# Patient Record
Sex: MALE | Race: BLACK OR AFRICAN AMERICAN | NOT HISPANIC OR LATINO | Employment: OTHER | URBAN - METROPOLITAN AREA
[De-identification: names, ages, dates, MRNs, and addresses within clinical notes are randomized per-mention and may not be internally consistent; named-entity substitution may affect disease eponyms.]

---

## 2023-01-22 ENCOUNTER — HOSPITAL ENCOUNTER (EMERGENCY)
Facility: HOSPITAL | Age: 28
Discharge: HOME | End: 2023-01-22
Attending: EMERGENCY MEDICINE
Payer: OTHER GOVERNMENT

## 2023-01-22 VITALS
HEIGHT: 72 IN | RESPIRATION RATE: 18 BRPM | TEMPERATURE: 99 F | WEIGHT: 218.25 LBS | DIASTOLIC BLOOD PRESSURE: 61 MMHG | SYSTOLIC BLOOD PRESSURE: 126 MMHG | OXYGEN SATURATION: 98 % | HEART RATE: 70 BPM | BODY MASS INDEX: 29.56 KG/M2

## 2023-01-22 DIAGNOSIS — B34.9 VIRAL SYNDROME: ICD-10-CM

## 2023-01-22 DIAGNOSIS — R11.2 NAUSEA AND VOMITING, UNSPECIFIED VOMITING TYPE: Primary | ICD-10-CM

## 2023-01-22 LAB
ALBUMIN SERPL BCP-MCNC: 3.8 G/DL (ref 3.5–5.2)
ALP SERPL-CCNC: 64 U/L (ref 55–135)
ALT SERPL W/O P-5'-P-CCNC: 99 U/L (ref 10–44)
ANION GAP SERPL CALC-SCNC: 11 MMOL/L (ref 8–16)
AST SERPL-CCNC: 67 U/L (ref 10–40)
BASOPHILS NFR BLD: 0 % (ref 0–1.9)
BILIRUB SERPL-MCNC: 1 MG/DL (ref 0.1–1)
BUN SERPL-MCNC: 12 MG/DL (ref 6–20)
CALCIUM SERPL-MCNC: 9.3 MG/DL (ref 8.7–10.5)
CHLORIDE SERPL-SCNC: 103 MMOL/L (ref 95–110)
CO2 SERPL-SCNC: 25 MMOL/L (ref 23–29)
CREAT SERPL-MCNC: 1.1 MG/DL (ref 0.5–1.4)
DIFFERENTIAL METHOD: ABNORMAL
EOSINOPHIL NFR BLD: 0 % (ref 0–8)
ERYTHROCYTE [DISTWIDTH] IN BLOOD BY AUTOMATED COUNT: 14.2 % (ref 11.5–14.5)
EST. GFR  (NO RACE VARIABLE): >60 ML/MIN/1.73 M^2
GLUCOSE SERPL-MCNC: 103 MG/DL (ref 70–110)
HCT VFR BLD AUTO: 42.5 % (ref 40–54)
HGB BLD-MCNC: 13.4 G/DL (ref 14–18)
IMM GRANULOCYTES # BLD AUTO: ABNORMAL K/UL
IMM GRANULOCYTES NFR BLD AUTO: ABNORMAL %
LIPASE SERPL-CCNC: 20 U/L (ref 4–60)
LYMPHOCYTES NFR BLD: 49 % (ref 18–48)
MCH RBC QN AUTO: 27.5 PG (ref 27–31)
MCHC RBC AUTO-ENTMCNC: 31.5 G/DL (ref 32–36)
MCV RBC AUTO: 87 FL (ref 82–98)
MONOCYTES NFR BLD: 2 % (ref 4–15)
NEUTROPHILS NFR BLD: 47 % (ref 38–73)
NEUTS BAND NFR BLD MANUAL: 2 %
NRBC BLD-RTO: 0 /100 WBC
PLATELET # BLD AUTO: 195 K/UL (ref 150–450)
PLATELET BLD QL SMEAR: ABNORMAL
PMV BLD AUTO: 10.4 FL (ref 9.2–12.9)
POTASSIUM SERPL-SCNC: 3.9 MMOL/L (ref 3.5–5.1)
PROT SERPL-MCNC: 8.1 G/DL (ref 6–8.4)
RBC # BLD AUTO: 4.87 M/UL (ref 4.6–6.2)
SODIUM SERPL-SCNC: 139 MMOL/L (ref 136–145)
WBC # BLD AUTO: 11.37 K/UL (ref 3.9–12.7)

## 2023-01-22 PROCEDURE — 96361 HYDRATE IV INFUSION ADD-ON: CPT

## 2023-01-22 PROCEDURE — 83690 ASSAY OF LIPASE: CPT | Performed by: EMERGENCY MEDICINE

## 2023-01-22 PROCEDURE — 80053 COMPREHEN METABOLIC PANEL: CPT | Performed by: EMERGENCY MEDICINE

## 2023-01-22 PROCEDURE — 36415 COLL VENOUS BLD VENIPUNCTURE: CPT | Performed by: EMERGENCY MEDICINE

## 2023-01-22 PROCEDURE — 99284 EMERGENCY DEPT VISIT MOD MDM: CPT | Mod: 25

## 2023-01-22 PROCEDURE — 85027 COMPLETE CBC AUTOMATED: CPT | Performed by: EMERGENCY MEDICINE

## 2023-01-22 PROCEDURE — 63600175 PHARM REV CODE 636 W HCPCS: Performed by: EMERGENCY MEDICINE

## 2023-01-22 PROCEDURE — 85007 BL SMEAR W/DIFF WBC COUNT: CPT | Performed by: EMERGENCY MEDICINE

## 2023-01-22 PROCEDURE — 96374 THER/PROPH/DIAG INJ IV PUSH: CPT

## 2023-01-22 RX ORDER — ONDANSETRON 2 MG/ML
4 INJECTION INTRAMUSCULAR; INTRAVENOUS
Status: COMPLETED | OUTPATIENT
Start: 2023-01-22 | End: 2023-01-22

## 2023-01-22 RX ORDER — ONDANSETRON 4 MG/1
4 TABLET, ORALLY DISINTEGRATING ORAL EVERY 8 HOURS PRN
Qty: 12 TABLET | Refills: 0 | Status: SHIPPED | OUTPATIENT
Start: 2023-01-22

## 2023-01-22 RX ADMIN — SODIUM CHLORIDE, POTASSIUM CHLORIDE, SODIUM LACTATE AND CALCIUM CHLORIDE 1000 ML: 600; 310; 30; 20 INJECTION, SOLUTION INTRAVENOUS at 04:01

## 2023-01-22 RX ADMIN — ONDANSETRON 4 MG: 2 INJECTION INTRAMUSCULAR; INTRAVENOUS at 04:01

## 2023-01-22 NOTE — ED PROVIDER NOTES
Encounter Date: 1/22/2023    SCRIBE #1 NOTE: I, Irais Leiva, am scribing for, and in the presence of,  Rodo Ohara MD.     History     Chief Complaint   Patient presents with    Vomiting     Diagnosed with the flu on Tuesday. Pt has been taking meds with no relief.      Time seen by provider: 4:07 PM on 01/22/2023    Galdino Burrell is a 27 y.o. male with no PMHx or PSHx documented who presents to the ED for evaluation of persistent N/V that onset 5 days ago.  Patient reports being evaluated by urgent care at that time where he was diagnosed with the flu.  He was not prescribed Tamiflu at that visit, but was started on a nasal spray, Ibuprofen and cough suppressants with Sx continuing.  Patient reports associated subjective fevers, episodic diarrhea and mild abdominal pain.  He expresses concern for dehydration.  Patient notes his emesis is dark colored, but admits uncertainty if it is due to the presence of blood.  The patient denies any other symptoms at this time.      The history is provided by the patient.   Review of patient's allergies indicates:  No Known Allergies  No past medical history on file.  No past surgical history on file.  No family history on file.     Review of Systems   Constitutional:  Positive for fever (subjective).   HENT:  Negative for sore throat.    Respiratory:  Negative for shortness of breath.    Cardiovascular:  Negative for chest pain.   Gastrointestinal:  Positive for abdominal pain (mild), diarrhea, nausea and vomiting.   Genitourinary:  Negative for dysuria.   Musculoskeletal:  Negative for back pain.   Skin:  Negative for rash.   Neurological:  Negative for weakness.   Hematological:  Does not bruise/bleed easily.     Physical Exam     Initial Vitals   BP Pulse Resp Temp SpO2   01/22/23 1556 01/22/23 1553 01/22/23 1553 01/22/23 1553 01/22/23 1553   (!) 144/84 71 16 98.9 °F (37.2 °C) 100 %      MAP       --                Physical Exam    Nursing note and vitals  reviewed.  Constitutional: He appears well-developed and well-nourished. He is not diaphoretic. No distress.   HENT:   Head: Normocephalic and atraumatic.   Mouth/Throat: Mucous membranes are dry.   Eyes: EOM are normal. Pupils are equal, round, and reactive to light.   Neck: Neck supple.   Normal range of motion.  Cardiovascular:  Normal rate, regular rhythm, normal heart sounds and intact distal pulses.     Exam reveals no gallop and no friction rub.       No murmur heard.  Pulmonary/Chest: Breath sounds normal. No respiratory distress. He has no wheezes. He has no rhonchi. He has no rales.   Abdominal: Abdomen is soft. Bowel sounds are normal. There is no abdominal tenderness. There is no rebound and no guarding.   Musculoskeletal:         General: Normal range of motion.      Cervical back: Normal range of motion and neck supple.     Neurological: He is alert and oriented to person, place, and time.   Skin: Skin is warm.   Psychiatric: He has a normal mood and affect. His behavior is normal. Judgment and thought content normal.       ED Course   Procedures  Labs Reviewed   COMPREHENSIVE METABOLIC PANEL - Abnormal; Notable for the following components:       Result Value    AST 67 (*)     ALT 99 (*)     All other components within normal limits   CBC W/ AUTO DIFFERENTIAL - Abnormal; Notable for the following components:    Hemoglobin 13.4 (*)     MCHC 31.5 (*)     Lymph % 49.0 (*)     Mono % 2.0 (*)     All other components within normal limits   LIPASE          Imaging Results    None          Medications   ondansetron injection 4 mg (4 mg Intravenous Given 1/22/23 1634)   lactated ringers bolus 1,000 mL (1,000 mLs Intravenous New Bag 1/22/23 1634)     Medical Decision Making:   History:   Old Medical Records: I decided to obtain old medical records.  Initial Assessment:   27-year-old male presented with vomiting.  Differential Diagnosis:   Initial differential diagnosis included but not limited to viral  illness, dehydration, and electrolyte abnormality.  Clinical Tests:   Lab Tests: Ordered and Reviewed  ED Management:  The patient was emergently evaluated in the emergency department, his evaluation was significant for a well-appearing male with a benign abdominal exam.  The patient's blood work shows no acute abnormalities.  The patient was treated with IV fluids and IV Zofran, with improvement in his symptoms.  The etiology of his vomiting is likely secondary to his recent flu diagnosis.  The patient is stable for discharge to home and does not require admission at this time.  He will be discharged home with a prescription for ODT Zofran.  He is referred to primary care for follow-up.        Scribe Attestation:   Scribe #1: I performed the above scribed service and the documentation accurately describes the services I performed. I attest to the accuracy of the note.               I, Dr. Rodo Ohara, personally performed the services described in this documentation. All medical record entries made by the scribe were at my direction and in my presence.  I have reviewed the chart and agree that the record reflects my personal performance and is accurate and complete. Rodo Ohara MD.  5:46 PM 01/22/2023      Clinical Impression:   Final diagnoses:  [R11.2] Nausea and vomiting, unspecified vomiting type (Primary)  [B34.9] Viral syndrome        ED Disposition Condition    Discharge Stable          ED Prescriptions       Medication Sig Dispense Start Date End Date Auth. Provider    ondansetron (ZOFRAN-ODT) 4 MG TbDL Take 1 tablet (4 mg total) by mouth every 8 (eight) hours as needed (nausea/vomiting). 12 tablet 1/22/2023 -- Rodo Ohara MD          Follow-up Information       Follow up With Specialties Details Why Contact [facility]  Schedule an appointment as soon as possible for a visit   [facility] [place] [address]  [phone]               Rodo  HARRY Ohara MD  01/22/23 5719

## 2023-01-22 NOTE — Clinical Note
"Galdino"Anastasia Burrell was seen and treated in our emergency department on 1/22/2023.  He may return to work on 01/24/2023.       If you have any questions or concerns, please don't hesitate to call.      Edgar Murray RN    "

## 2023-02-04 ENCOUNTER — HOSPITAL ENCOUNTER (INPATIENT)
Facility: HOSPITAL | Age: 28
LOS: 1 days | Discharge: ANOTHER HOSPITAL | DRG: 812 | End: 2023-02-05
Attending: EMERGENCY MEDICINE | Admitting: STUDENT IN AN ORGANIZED HEALTH CARE EDUCATION/TRAINING PROGRAM
Payer: OTHER GOVERNMENT

## 2023-02-04 DIAGNOSIS — K25.4 GASTROINTESTINAL HEMORRHAGE ASSOCIATED WITH GASTRIC ULCER: Primary | ICD-10-CM

## 2023-02-04 DIAGNOSIS — T80.92XA TRANSFUSION REACTION: ICD-10-CM

## 2023-02-04 DIAGNOSIS — D50.0 IRON DEFICIENCY ANEMIA DUE TO CHRONIC BLOOD LOSS: ICD-10-CM

## 2023-02-04 LAB
ABO + RH BLD: NORMAL
ALBUMIN SERPL BCP-MCNC: 2.4 G/DL (ref 3.5–5.2)
ALBUMIN SERPL BCP-MCNC: 2.9 G/DL (ref 3.5–5.2)
ALP SERPL-CCNC: 45 U/L (ref 55–135)
ALP SERPL-CCNC: 51 U/L (ref 55–135)
ALT SERPL W/O P-5'-P-CCNC: 20 U/L (ref 10–44)
ALT SERPL W/O P-5'-P-CCNC: 26 U/L (ref 10–44)
ANION GAP SERPL CALC-SCNC: 6 MMOL/L (ref 8–16)
ANION GAP SERPL CALC-SCNC: 7 MMOL/L (ref 8–16)
AST SERPL-CCNC: 16 U/L (ref 10–40)
AST SERPL-CCNC: 17 U/L (ref 10–40)
BASOPHILS # BLD AUTO: 0.04 K/UL (ref 0–0.2)
BASOPHILS NFR BLD: 0.3 % (ref 0–1.9)
BILIRUB SERPL-MCNC: 0.1 MG/DL (ref 0.1–1)
BILIRUB SERPL-MCNC: 0.2 MG/DL (ref 0.1–1)
BLD GP AB SCN CELLS X3 SERPL QL: NORMAL
BUN SERPL-MCNC: 22 MG/DL (ref 6–20)
BUN SERPL-MCNC: 23 MG/DL (ref 6–20)
CALCIUM SERPL-MCNC: 7.3 MG/DL (ref 8.7–10.5)
CALCIUM SERPL-MCNC: 7.9 MG/DL (ref 8.7–10.5)
CHLORIDE SERPL-SCNC: 110 MMOL/L (ref 95–110)
CHLORIDE SERPL-SCNC: 112 MMOL/L (ref 95–110)
CO2 SERPL-SCNC: 23 MMOL/L (ref 23–29)
CO2 SERPL-SCNC: 23 MMOL/L (ref 23–29)
CREAT SERPL-MCNC: 0.9 MG/DL (ref 0.5–1.4)
CREAT SERPL-MCNC: 0.9 MG/DL (ref 0.5–1.4)
DIFFERENTIAL METHOD: ABNORMAL
EOSINOPHIL # BLD AUTO: 0.1 K/UL (ref 0–0.5)
EOSINOPHIL NFR BLD: 1.1 % (ref 0–8)
ERYTHROCYTE [DISTWIDTH] IN BLOOD BY AUTOMATED COUNT: 15.2 % (ref 11.5–14.5)
EST. GFR  (NO RACE VARIABLE): >60 ML/MIN/1.73 M^2
EST. GFR  (NO RACE VARIABLE): >60 ML/MIN/1.73 M^2
GLUCOSE SERPL-MCNC: 114 MG/DL (ref 70–110)
GLUCOSE SERPL-MCNC: 135 MG/DL (ref 70–110)
HCT VFR BLD AUTO: 15.8 % (ref 40–54)
HGB BLD-MCNC: 4.9 G/DL (ref 14–18)
IMM GRANULOCYTES # BLD AUTO: 0.11 K/UL (ref 0–0.04)
IMM GRANULOCYTES NFR BLD AUTO: 0.9 % (ref 0–0.5)
LYMPHOCYTES # BLD AUTO: 3.7 K/UL (ref 1–4.8)
LYMPHOCYTES NFR BLD: 29 % (ref 18–48)
MCH RBC QN AUTO: 28.5 PG (ref 27–31)
MCHC RBC AUTO-ENTMCNC: 31 G/DL (ref 32–36)
MCV RBC AUTO: 92 FL (ref 82–98)
MONOCYTES # BLD AUTO: 0.6 K/UL (ref 0.3–1)
MONOCYTES NFR BLD: 4.3 % (ref 4–15)
NEUTROPHILS # BLD AUTO: 8.2 K/UL (ref 1.8–7.7)
NEUTROPHILS NFR BLD: 64.4 % (ref 38–73)
NRBC BLD-RTO: 1 /100 WBC
PLATELET # BLD AUTO: 153 K/UL (ref 150–450)
PLATELET BLD QL SMEAR: ABNORMAL
PMV BLD AUTO: 11.1 FL (ref 9.2–12.9)
POTASSIUM SERPL-SCNC: 3.6 MMOL/L (ref 3.5–5.1)
POTASSIUM SERPL-SCNC: 4 MMOL/L (ref 3.5–5.1)
PROT SERPL-MCNC: 4.2 G/DL (ref 6–8.4)
PROT SERPL-MCNC: 5 G/DL (ref 6–8.4)
RBC # BLD AUTO: 1.72 M/UL (ref 4.6–6.2)
SODIUM SERPL-SCNC: 140 MMOL/L (ref 136–145)
SODIUM SERPL-SCNC: 141 MMOL/L (ref 136–145)
WBC # BLD AUTO: 12.73 K/UL (ref 3.9–12.7)

## 2023-02-04 PROCEDURE — P9016 RBC LEUKOCYTES REDUCED: HCPCS | Performed by: EMERGENCY MEDICINE

## 2023-02-04 PROCEDURE — 36415 COLL VENOUS BLD VENIPUNCTURE: CPT | Performed by: NURSE PRACTITIONER

## 2023-02-04 PROCEDURE — 80053 COMPREHEN METABOLIC PANEL: CPT | Performed by: EMERGENCY MEDICINE

## 2023-02-04 PROCEDURE — 96375 TX/PRO/DX INJ NEW DRUG ADDON: CPT

## 2023-02-04 PROCEDURE — 85025 COMPLETE CBC W/AUTO DIFF WBC: CPT | Performed by: EMERGENCY MEDICINE

## 2023-02-04 PROCEDURE — 63600175 PHARM REV CODE 636 W HCPCS: Performed by: EMERGENCY MEDICINE

## 2023-02-04 PROCEDURE — 36430 TRANSFUSION BLD/BLD COMPNT: CPT

## 2023-02-04 PROCEDURE — 12000002 HC ACUTE/MED SURGE SEMI-PRIVATE ROOM

## 2023-02-04 PROCEDURE — 36415 COLL VENOUS BLD VENIPUNCTURE: CPT | Performed by: EMERGENCY MEDICINE

## 2023-02-04 PROCEDURE — 96361 HYDRATE IV INFUSION ADD-ON: CPT

## 2023-02-04 PROCEDURE — 87389 HIV-1 AG W/HIV-1&-2 AB AG IA: CPT | Performed by: EMERGENCY MEDICINE

## 2023-02-04 PROCEDURE — 80053 COMPREHEN METABOLIC PANEL: CPT | Mod: 91 | Performed by: NURSE PRACTITIONER

## 2023-02-04 PROCEDURE — 86900 BLOOD TYPING SEROLOGIC ABO: CPT | Performed by: EMERGENCY MEDICINE

## 2023-02-04 PROCEDURE — 86920 COMPATIBILITY TEST SPIN: CPT | Performed by: EMERGENCY MEDICINE

## 2023-02-04 PROCEDURE — 86803 HEPATITIS C AB TEST: CPT | Performed by: EMERGENCY MEDICINE

## 2023-02-04 PROCEDURE — 25000003 PHARM REV CODE 250: Performed by: EMERGENCY MEDICINE

## 2023-02-04 PROCEDURE — 96374 THER/PROPH/DIAG INJ IV PUSH: CPT

## 2023-02-04 RX ORDER — BUTALBITAL, ACETAMINOPHEN AND CAFFEINE 50; 325; 40 MG/1; MG/1; MG/1
1 TABLET ORAL
Status: COMPLETED | OUTPATIENT
Start: 2023-02-04 | End: 2023-02-04

## 2023-02-04 RX ORDER — PANTOPRAZOLE SODIUM 40 MG/10ML
40 INJECTION, POWDER, LYOPHILIZED, FOR SOLUTION INTRAVENOUS
Status: ACTIVE | OUTPATIENT
Start: 2023-02-04 | End: 2023-02-05

## 2023-02-04 RX ORDER — HYDROCODONE BITARTRATE AND ACETAMINOPHEN 500; 5 MG/1; MG/1
TABLET ORAL
Status: DISCONTINUED | OUTPATIENT
Start: 2023-02-04 | End: 2023-02-05 | Stop reason: HOSPADM

## 2023-02-04 RX ORDER — ACETAMINOPHEN 325 MG/1
325 TABLET ORAL
Status: COMPLETED | OUTPATIENT
Start: 2023-02-04 | End: 2023-02-04

## 2023-02-04 RX ORDER — SODIUM CHLORIDE 9 MG/ML
INJECTION, SOLUTION INTRAVENOUS CONTINUOUS
Status: DISCONTINUED | OUTPATIENT
Start: 2023-02-05 | End: 2023-02-05

## 2023-02-04 RX ORDER — PANTOPRAZOLE SODIUM 40 MG/1
40 TABLET, DELAYED RELEASE ORAL
Status: COMPLETED | OUTPATIENT
Start: 2023-02-04 | End: 2023-02-04

## 2023-02-04 RX ORDER — SODIUM CHLORIDE 9 MG/ML
INJECTION, SOLUTION INTRAVENOUS
Status: COMPLETED | OUTPATIENT
Start: 2023-02-04 | End: 2023-02-04

## 2023-02-04 RX ORDER — MAG HYDROX/ALUMINUM HYD/SIMETH 200-200-20
5 SUSPENSION, ORAL (FINAL DOSE FORM) ORAL
Status: COMPLETED | OUTPATIENT
Start: 2023-02-04 | End: 2023-02-04

## 2023-02-04 RX ORDER — METOCLOPRAMIDE HYDROCHLORIDE 5 MG/ML
10 INJECTION INTRAMUSCULAR; INTRAVENOUS
Status: COMPLETED | OUTPATIENT
Start: 2023-02-04 | End: 2023-02-04

## 2023-02-04 RX ORDER — KETOROLAC TROMETHAMINE 30 MG/ML
30 INJECTION, SOLUTION INTRAMUSCULAR; INTRAVENOUS
Status: COMPLETED | OUTPATIENT
Start: 2023-02-04 | End: 2023-02-04

## 2023-02-04 RX ADMIN — KETOROLAC TROMETHAMINE 30 MG: 30 INJECTION, SOLUTION INTRAMUSCULAR; INTRAVENOUS at 09:02

## 2023-02-04 RX ADMIN — ALUMINUM HYDROXIDE, MAGNESIUM HYDROXIDE, AND SIMETHICONE 5 ML: 200; 200; 20 SUSPENSION ORAL at 10:02

## 2023-02-04 RX ADMIN — BUTALBITAL, ACETAMINOPHEN, AND CAFFEINE 1 TABLET: 50; 325; 40 TABLET ORAL at 09:02

## 2023-02-04 RX ADMIN — PANTOPRAZOLE SODIUM 40 MG: 40 TABLET, DELAYED RELEASE ORAL at 09:02

## 2023-02-04 RX ADMIN — ACETAMINOPHEN 325 MG: 325 TABLET ORAL at 09:02

## 2023-02-04 RX ADMIN — METOCLOPRAMIDE 10 MG: 5 INJECTION, SOLUTION INTRAMUSCULAR; INTRAVENOUS at 09:02

## 2023-02-04 RX ADMIN — SODIUM CHLORIDE: 9 INJECTION, SOLUTION INTRAVENOUS at 09:02

## 2023-02-04 NOTE — Clinical Note
Diagnosis: Gastrointestinal hemorrhage associated with gastric ulcer [[id number]]   Admitting Provider:: SREEKANTH BRAVO [[id number]]   Future Attending Provider: SREEKANTH BRAVO [[id number]]   Reason for IP Medical Treatment  (Clinical interventions that can only be accomplished in the IP setting? ) :: GI bleed   Estimated Length of Stay:: 2 midnights   I certify that Inpatient services for greater than or equal to 2 midnights are medically necessary:: Yes   Plans for Post-Acute care--if anticipated (pick the single best option):: A. No post acute care anticipated at this time   Special Needs:: No Special Needs [1]

## 2023-02-05 ENCOUNTER — HOSPITAL ENCOUNTER (INPATIENT)
Facility: HOSPITAL | Age: 28
LOS: 2 days | Discharge: HOME | DRG: 811 | End: 2023-02-07
Attending: INTERNAL MEDICINE | Admitting: INTERNAL MEDICINE
Payer: OTHER GOVERNMENT

## 2023-02-05 ENCOUNTER — ANESTHESIA EVENT (OUTPATIENT)
Dept: SURGERY | Facility: HOSPITAL | Age: 28
DRG: 811 | End: 2023-02-05
Payer: OTHER GOVERNMENT

## 2023-02-05 ENCOUNTER — ANESTHESIA (OUTPATIENT)
Dept: SURGERY | Facility: HOSPITAL | Age: 28
DRG: 811 | End: 2023-02-05
Payer: OTHER GOVERNMENT

## 2023-02-05 VITALS
SYSTOLIC BLOOD PRESSURE: 111 MMHG | DIASTOLIC BLOOD PRESSURE: 57 MMHG | RESPIRATION RATE: 21 BRPM | HEIGHT: 72 IN | WEIGHT: 222.88 LBS | OXYGEN SATURATION: 100 % | HEART RATE: 96 BPM | TEMPERATURE: 100 F | BODY MASS INDEX: 30.19 KG/M2

## 2023-02-05 DIAGNOSIS — K92.2 GASTROINTESTINAL HEMORRHAGE, UNSPECIFIED GASTROINTESTINAL HEMORRHAGE TYPE: Primary | ICD-10-CM

## 2023-02-05 DIAGNOSIS — D64.9 SYMPTOMATIC ANEMIA: ICD-10-CM

## 2023-02-05 DIAGNOSIS — R07.9 CHEST PAIN: ICD-10-CM

## 2023-02-05 DIAGNOSIS — T80.92XA: ICD-10-CM

## 2023-02-05 PROBLEM — D69.6 THROMBOCYTOPENIA: Status: ACTIVE | Noted: 2023-02-05

## 2023-02-05 PROBLEM — R50.9 FEVER: Status: ACTIVE | Noted: 2023-02-05

## 2023-02-05 PROBLEM — D62 ACUTE BLOOD LOSS ANEMIA: Status: ACTIVE | Noted: 2023-02-05

## 2023-02-05 LAB
ABO + RH BLD: NORMAL
ALBUMIN SERPL BCP-MCNC: 2.5 G/DL (ref 3.5–5.2)
ALP SERPL-CCNC: 35 U/L (ref 55–135)
ALT SERPL W/O P-5'-P-CCNC: 22 U/L (ref 10–44)
AMMONIA PLAS-SCNC: 41 UMOL/L (ref 10–50)
AMPHET+METHAMPHET UR QL: NEGATIVE
ANION GAP SERPL CALC-SCNC: 6 MMOL/L (ref 8–16)
AST SERPL-CCNC: 15 U/L (ref 10–40)
BARBITURATES UR QL SCN>200 NG/ML: ABNORMAL
BASOPHILS # BLD AUTO: 0.04 K/UL (ref 0–0.2)
BASOPHILS # BLD AUTO: 0.04 K/UL (ref 0–0.2)
BASOPHILS NFR BLD: 0.3 % (ref 0–1.9)
BASOPHILS NFR BLD: 0.3 % (ref 0–1.9)
BENZODIAZ UR QL SCN>200 NG/ML: NEGATIVE
BILIRUB SERPL-MCNC: 0.2 MG/DL (ref 0.1–1)
BLD GP AB SCN CELLS X3 SERPL QL: NORMAL
BLD PROD TYP BPU: NORMAL
BLOOD UNIT EXPIRATION DATE: NORMAL
BLOOD UNIT TYPE CODE: 5100
BLOOD UNIT TYPE: NORMAL
BUN SERPL-MCNC: 26 MG/DL (ref 6–20)
BZE UR QL SCN: NEGATIVE
CALCIUM SERPL-MCNC: 7.6 MG/DL (ref 8.7–10.5)
CANNABINOIDS UR QL SCN: NEGATIVE
CHLORIDE SERPL-SCNC: 112 MMOL/L (ref 95–110)
CO2 SERPL-SCNC: 23 MMOL/L (ref 23–29)
CODING SYSTEM: NORMAL
CREAT SERPL-MCNC: 1 MG/DL (ref 0.5–1.4)
CREAT UR-MCNC: 130.2 MG/DL (ref 23–375)
CROSSMATCH INTERPRETATION: NORMAL
CROSSMATCH INTERPRETATION: NORMAL
DIFFERENTIAL METHOD: ABNORMAL
DIFFERENTIAL METHOD: ABNORMAL
DISPENSE STATUS: NORMAL
EOSINOPHIL # BLD AUTO: 0 K/UL (ref 0–0.5)
EOSINOPHIL # BLD AUTO: 0 K/UL (ref 0–0.5)
EOSINOPHIL NFR BLD: 0.2 % (ref 0–8)
EOSINOPHIL NFR BLD: 0.2 % (ref 0–8)
ERYTHROCYTE [DISTWIDTH] IN BLOOD BY AUTOMATED COUNT: 15.1 % (ref 11.5–14.5)
ERYTHROCYTE [DISTWIDTH] IN BLOOD BY AUTOMATED COUNT: 15.1 % (ref 11.5–14.5)
EST. GFR  (NO RACE VARIABLE): >60 ML/MIN/1.73 M^2
GLUCOSE SERPL-MCNC: 103 MG/DL (ref 70–110)
HCT VFR BLD AUTO: 16.9 % (ref 40–54)
HCT VFR BLD AUTO: 16.9 % (ref 40–54)
HCT VFR BLD AUTO: 17.3 % (ref 40–54)
HCV AB SERPL QL IA: NORMAL
HGB BLD-MCNC: 5.4 G/DL (ref 14–18)
HGB BLD-MCNC: 5.4 G/DL (ref 14–18)
HGB BLD-MCNC: 5.5 G/DL (ref 14–18)
HIV 1+2 AB+HIV1 P24 AG SERPL QL IA: NORMAL
IMM GRANULOCYTES # BLD AUTO: 0.09 K/UL (ref 0–0.04)
IMM GRANULOCYTES # BLD AUTO: 0.09 K/UL (ref 0–0.04)
IMM GRANULOCYTES NFR BLD AUTO: 0.7 % (ref 0–0.5)
IMM GRANULOCYTES NFR BLD AUTO: 0.7 % (ref 0–0.5)
INR PPP: 1.1 (ref 0.8–1.2)
LACTATE SERPL-SCNC: 1.2 MMOL/L (ref 0.5–2.2)
LYMPHOCYTES # BLD AUTO: 3.9 K/UL (ref 1–4.8)
LYMPHOCYTES # BLD AUTO: 3.9 K/UL (ref 1–4.8)
LYMPHOCYTES NFR BLD: 30 % (ref 18–48)
LYMPHOCYTES NFR BLD: 30 % (ref 18–48)
MCH RBC QN AUTO: 29 PG (ref 27–31)
MCH RBC QN AUTO: 29 PG (ref 27–31)
MCHC RBC AUTO-ENTMCNC: 32 G/DL (ref 32–36)
MCHC RBC AUTO-ENTMCNC: 32 G/DL (ref 32–36)
MCV RBC AUTO: 91 FL (ref 82–98)
MCV RBC AUTO: 91 FL (ref 82–98)
METHADONE UR QL SCN>300 NG/ML: NEGATIVE
MONOCYTES # BLD AUTO: 0.9 K/UL (ref 0.3–1)
MONOCYTES # BLD AUTO: 0.9 K/UL (ref 0.3–1)
MONOCYTES NFR BLD: 6.5 % (ref 4–15)
MONOCYTES NFR BLD: 6.5 % (ref 4–15)
NEUTROPHILS # BLD AUTO: 8.1 K/UL (ref 1.8–7.7)
NEUTROPHILS # BLD AUTO: 8.1 K/UL (ref 1.8–7.7)
NEUTROPHILS NFR BLD: 62.3 % (ref 38–73)
NEUTROPHILS NFR BLD: 62.3 % (ref 38–73)
NRBC BLD-RTO: 2 /100 WBC
NRBC BLD-RTO: 2 /100 WBC
NUM UNITS TRANS PACKED RBC: NORMAL
OPIATES UR QL SCN: NEGATIVE
PCP UR QL SCN>25 NG/ML: NEGATIVE
PLATELET # BLD AUTO: 135 K/UL (ref 150–450)
PLATELET # BLD AUTO: 135 K/UL (ref 150–450)
PMV BLD AUTO: 11 FL (ref 9.2–12.9)
PMV BLD AUTO: 11 FL (ref 9.2–12.9)
POTASSIUM SERPL-SCNC: 3.8 MMOL/L (ref 3.5–5.1)
PROT SERPL-MCNC: 4.3 G/DL (ref 6–8.4)
PROTHROMBIN TIME: 11.3 SEC (ref 9–12.5)
RBC # BLD AUTO: 1.86 M/UL (ref 4.6–6.2)
RBC # BLD AUTO: 1.86 M/UL (ref 4.6–6.2)
SODIUM SERPL-SCNC: 141 MMOL/L (ref 136–145)
TOXICOLOGY INFORMATION: ABNORMAL
WBC # BLD AUTO: 13.05 K/UL (ref 3.9–12.7)
WBC # BLD AUTO: 13.05 K/UL (ref 3.9–12.7)

## 2023-02-05 PROCEDURE — 94761 N-INVAS EAR/PLS OXIMETRY MLT: CPT

## 2023-02-05 PROCEDURE — 36415 COLL VENOUS BLD VENIPUNCTURE: CPT | Performed by: INTERNAL MEDICINE

## 2023-02-05 PROCEDURE — 25000003 PHARM REV CODE 250: Performed by: NURSE PRACTITIONER

## 2023-02-05 PROCEDURE — D9220A PRA ANESTHESIA: ICD-10-PCS | Mod: CRNA,,, | Performed by: NURSE ANESTHETIST, CERTIFIED REGISTERED

## 2023-02-05 PROCEDURE — 99292 CRITICAL CARE ADDL 30 MIN: CPT

## 2023-02-05 PROCEDURE — 86922 COMPATIBILITY TEST ANTIGLOB: CPT | Performed by: INTERNAL MEDICINE

## 2023-02-05 PROCEDURE — 36430 TRANSFUSION BLD/BLD COMPNT: CPT

## 2023-02-05 PROCEDURE — 25000003 PHARM REV CODE 250: Performed by: NURSE ANESTHETIST, CERTIFIED REGISTERED

## 2023-02-05 PROCEDURE — 85025 COMPLETE CBC W/AUTO DIFF WBC: CPT | Performed by: NURSE PRACTITIONER

## 2023-02-05 PROCEDURE — 82140 ASSAY OF AMMONIA: CPT | Performed by: STUDENT IN AN ORGANIZED HEALTH CARE EDUCATION/TRAINING PROGRAM

## 2023-02-05 PROCEDURE — 43239 EGD BIOPSY SINGLE/MULTIPLE: CPT | Performed by: STUDENT IN AN ORGANIZED HEALTH CARE EDUCATION/TRAINING PROGRAM

## 2023-02-05 PROCEDURE — 25000003 PHARM REV CODE 250

## 2023-02-05 PROCEDURE — 37000008 HC ANESTHESIA 1ST 15 MINUTES: Performed by: STUDENT IN AN ORGANIZED HEALTH CARE EDUCATION/TRAINING PROGRAM

## 2023-02-05 PROCEDURE — 36415 COLL VENOUS BLD VENIPUNCTURE: CPT | Performed by: STUDENT IN AN ORGANIZED HEALTH CARE EDUCATION/TRAINING PROGRAM

## 2023-02-05 PROCEDURE — P9016 RBC LEUKOCYTES REDUCED: HCPCS | Performed by: INTERNAL MEDICINE

## 2023-02-05 PROCEDURE — 63600175 PHARM REV CODE 636 W HCPCS

## 2023-02-05 PROCEDURE — 25000003 PHARM REV CODE 250: Performed by: INTERNAL MEDICINE

## 2023-02-05 PROCEDURE — 27000221 HC OXYGEN, UP TO 24 HOURS

## 2023-02-05 PROCEDURE — 99291 CRITICAL CARE FIRST HOUR: CPT | Mod: 25

## 2023-02-05 PROCEDURE — P9016 RBC LEUKOCYTES REDUCED: HCPCS | Performed by: EMERGENCY MEDICINE

## 2023-02-05 PROCEDURE — C9113 INJ PANTOPRAZOLE SODIUM, VIA: HCPCS | Performed by: INTERNAL MEDICINE

## 2023-02-05 PROCEDURE — 87040 BLOOD CULTURE FOR BACTERIA: CPT | Performed by: STUDENT IN AN ORGANIZED HEALTH CARE EDUCATION/TRAINING PROGRAM

## 2023-02-05 PROCEDURE — 27201038 HC PROBE, BI-POLAR: Performed by: STUDENT IN AN ORGANIZED HEALTH CARE EDUCATION/TRAINING PROGRAM

## 2023-02-05 PROCEDURE — 96375 TX/PRO/DX INJ NEW DRUG ADDON: CPT

## 2023-02-05 PROCEDURE — 27200043 HC FORCEPS, BIOPSY: Performed by: STUDENT IN AN ORGANIZED HEALTH CARE EDUCATION/TRAINING PROGRAM

## 2023-02-05 PROCEDURE — 63600175 PHARM REV CODE 636 W HCPCS: Performed by: NURSE ANESTHETIST, CERTIFIED REGISTERED

## 2023-02-05 PROCEDURE — 86900 BLOOD TYPING SEROLOGIC ABO: CPT | Performed by: INTERNAL MEDICINE

## 2023-02-05 PROCEDURE — 88305 TISSUE EXAM BY PATHOLOGIST: CPT | Mod: TC

## 2023-02-05 PROCEDURE — 80053 COMPREHEN METABOLIC PANEL: CPT | Performed by: STUDENT IN AN ORGANIZED HEALTH CARE EDUCATION/TRAINING PROGRAM

## 2023-02-05 PROCEDURE — 43255 EGD CONTROL BLEEDING ANY: CPT | Performed by: STUDENT IN AN ORGANIZED HEALTH CARE EDUCATION/TRAINING PROGRAM

## 2023-02-05 PROCEDURE — 63600175 PHARM REV CODE 636 W HCPCS: Performed by: STUDENT IN AN ORGANIZED HEALTH CARE EDUCATION/TRAINING PROGRAM

## 2023-02-05 PROCEDURE — 63600175 PHARM REV CODE 636 W HCPCS: Performed by: INTERNAL MEDICINE

## 2023-02-05 PROCEDURE — 80307 DRUG TEST PRSMV CHEM ANLYZR: CPT | Performed by: STUDENT IN AN ORGANIZED HEALTH CARE EDUCATION/TRAINING PROGRAM

## 2023-02-05 PROCEDURE — 37000009 HC ANESTHESIA EA ADD 15 MINS: Performed by: STUDENT IN AN ORGANIZED HEALTH CARE EDUCATION/TRAINING PROGRAM

## 2023-02-05 PROCEDURE — 83605 ASSAY OF LACTIC ACID: CPT | Performed by: STUDENT IN AN ORGANIZED HEALTH CARE EDUCATION/TRAINING PROGRAM

## 2023-02-05 PROCEDURE — 85610 PROTHROMBIN TIME: CPT | Performed by: STUDENT IN AN ORGANIZED HEALTH CARE EDUCATION/TRAINING PROGRAM

## 2023-02-05 PROCEDURE — 20000000 HC ICU ROOM

## 2023-02-05 PROCEDURE — 85014 HEMATOCRIT: CPT | Performed by: INTERNAL MEDICINE

## 2023-02-05 PROCEDURE — 85018 HEMOGLOBIN: CPT | Performed by: INTERNAL MEDICINE

## 2023-02-05 PROCEDURE — D9220A PRA ANESTHESIA: Mod: CRNA,,, | Performed by: NURSE ANESTHETIST, CERTIFIED REGISTERED

## 2023-02-05 PROCEDURE — 63600175 PHARM REV CODE 636 W HCPCS: Performed by: NURSE PRACTITIONER

## 2023-02-05 PROCEDURE — C9113 INJ PANTOPRAZOLE SODIUM, VIA: HCPCS | Performed by: NURSE PRACTITIONER

## 2023-02-05 PROCEDURE — D9220A PRA ANESTHESIA: ICD-10-PCS | Mod: ANES,,, | Performed by: ANESTHESIOLOGY

## 2023-02-05 PROCEDURE — D9220A PRA ANESTHESIA: Mod: ANES,,, | Performed by: ANESTHESIOLOGY

## 2023-02-05 RX ORDER — LORAZEPAM 2 MG/ML
2 INJECTION INTRAMUSCULAR
Status: COMPLETED | OUTPATIENT
Start: 2023-02-05 | End: 2023-02-05

## 2023-02-05 RX ORDER — CHLORHEXIDINE GLUCONATE ORAL RINSE 1.2 MG/ML
15 SOLUTION DENTAL 2 TIMES DAILY
Status: DISCONTINUED | OUTPATIENT
Start: 2023-02-05 | End: 2023-02-07

## 2023-02-05 RX ORDER — PROPOFOL 10 MG/ML
VIAL (ML) INTRAVENOUS
Status: DISCONTINUED | OUTPATIENT
Start: 2023-02-05 | End: 2023-02-05

## 2023-02-05 RX ORDER — ONDANSETRON 2 MG/ML
4 INJECTION INTRAMUSCULAR; INTRAVENOUS EVERY 8 HOURS PRN
Status: DISCONTINUED | OUTPATIENT
Start: 2023-02-05 | End: 2023-02-07 | Stop reason: HOSPADM

## 2023-02-05 RX ORDER — MUPIROCIN 20 MG/G
OINTMENT TOPICAL 2 TIMES DAILY
Status: DISCONTINUED | OUTPATIENT
Start: 2023-02-05 | End: 2023-02-07

## 2023-02-05 RX ORDER — ACETAMINOPHEN 325 MG/1
650 TABLET ORAL EVERY 4 HOURS PRN
Status: DISCONTINUED | OUTPATIENT
Start: 2023-02-05 | End: 2023-02-07 | Stop reason: HOSPADM

## 2023-02-05 RX ORDER — SODIUM,POTASSIUM PHOSPHATES 280-250MG
2 POWDER IN PACKET (EA) ORAL
Status: DISCONTINUED | OUTPATIENT
Start: 2023-02-05 | End: 2023-02-07 | Stop reason: HOSPADM

## 2023-02-05 RX ORDER — LIDOCAINE HYDROCHLORIDE 20 MG/ML
INJECTION, SOLUTION EPIDURAL; INFILTRATION; INTRACAUDAL; PERINEURAL
Status: DISCONTINUED | OUTPATIENT
Start: 2023-02-05 | End: 2023-02-05

## 2023-02-05 RX ORDER — SODIUM CHLORIDE 0.9 % (FLUSH) 0.9 %
10 SYRINGE (ML) INJECTION EVERY 6 HOURS PRN
Status: DISCONTINUED | OUTPATIENT
Start: 2023-02-05 | End: 2023-02-07 | Stop reason: HOSPADM

## 2023-02-05 RX ORDER — AMOXICILLIN 250 MG
2 CAPSULE ORAL 2 TIMES DAILY PRN
Status: DISCONTINUED | OUTPATIENT
Start: 2023-02-05 | End: 2023-02-07 | Stop reason: HOSPADM

## 2023-02-05 RX ORDER — NALOXONE HCL 0.4 MG/ML
0.02 VIAL (ML) INJECTION
Status: DISCONTINUED | OUTPATIENT
Start: 2023-02-05 | End: 2023-02-07 | Stop reason: HOSPADM

## 2023-02-05 RX ORDER — HYDROCODONE BITARTRATE AND ACETAMINOPHEN 500; 5 MG/1; MG/1
TABLET ORAL
Status: DISCONTINUED | OUTPATIENT
Start: 2023-02-05 | End: 2023-02-07 | Stop reason: HOSPADM

## 2023-02-05 RX ORDER — DIPHENHYDRAMINE HYDROCHLORIDE 50 MG/ML
12.5 INJECTION INTRAMUSCULAR; INTRAVENOUS EVERY 6 HOURS PRN
Status: DISCONTINUED | OUTPATIENT
Start: 2023-02-05 | End: 2023-02-07 | Stop reason: HOSPADM

## 2023-02-05 RX ORDER — ACETAMINOPHEN 325 MG/1
650 TABLET ORAL EVERY 6 HOURS PRN
Status: DISCONTINUED | OUTPATIENT
Start: 2023-02-05 | End: 2023-02-05 | Stop reason: HOSPADM

## 2023-02-05 RX ORDER — LANOLIN ALCOHOL/MO/W.PET/CERES
800 CREAM (GRAM) TOPICAL
Status: DISCONTINUED | OUTPATIENT
Start: 2023-02-05 | End: 2023-02-07 | Stop reason: HOSPADM

## 2023-02-05 RX ORDER — DEXTROSE MONOHYDRATE 100 MG/ML
12.5 INJECTION, SOLUTION INTRAVENOUS
Status: DISCONTINUED | OUTPATIENT
Start: 2023-02-05 | End: 2023-02-07 | Stop reason: HOSPADM

## 2023-02-05 RX ORDER — IBUPROFEN 200 MG
16 TABLET ORAL
Status: DISCONTINUED | OUTPATIENT
Start: 2023-02-05 | End: 2023-02-07 | Stop reason: HOSPADM

## 2023-02-05 RX ORDER — LORAZEPAM 2 MG/ML
INJECTION INTRAMUSCULAR
Status: COMPLETED
Start: 2023-02-05 | End: 2023-02-05

## 2023-02-05 RX ORDER — METOCLOPRAMIDE HYDROCHLORIDE 5 MG/ML
10 INJECTION INTRAMUSCULAR; INTRAVENOUS ONCE
Status: COMPLETED | OUTPATIENT
Start: 2023-02-05 | End: 2023-02-05

## 2023-02-05 RX ORDER — GLUCAGON 1 MG
1 KIT INJECTION
Status: DISCONTINUED | OUTPATIENT
Start: 2023-02-05 | End: 2023-02-07 | Stop reason: HOSPADM

## 2023-02-05 RX ORDER — ACETAMINOPHEN 325 MG/1
650 TABLET ORAL EVERY 8 HOURS PRN
Status: DISCONTINUED | OUTPATIENT
Start: 2023-02-05 | End: 2023-02-07 | Stop reason: HOSPADM

## 2023-02-05 RX ORDER — IBUPROFEN 200 MG
24 TABLET ORAL
Status: DISCONTINUED | OUTPATIENT
Start: 2023-02-05 | End: 2023-02-07 | Stop reason: HOSPADM

## 2023-02-05 RX ADMIN — SODIUM CHLORIDE 8 MG/HR: 900 INJECTION INTRAVENOUS at 04:02

## 2023-02-05 RX ADMIN — MUPIROCIN 1 G: 20 OINTMENT TOPICAL at 08:02

## 2023-02-05 RX ADMIN — LORAZEPAM 2 MG: 2 INJECTION INTRAMUSCULAR; INTRAVENOUS at 12:02

## 2023-02-05 RX ADMIN — SODIUM CHLORIDE 8 MG/HR: 9 INJECTION, SOLUTION INTRAVENOUS at 09:02

## 2023-02-05 RX ADMIN — LORAZEPAM 2 MG: 2 INJECTION INTRAMUSCULAR at 12:02

## 2023-02-05 RX ADMIN — SODIUM CHLORIDE 8 MG/HR: 9 INJECTION, SOLUTION INTRAVENOUS at 11:02

## 2023-02-05 RX ADMIN — SODIUM CHLORIDE: 9 INJECTION, SOLUTION INTRAVENOUS at 02:02

## 2023-02-05 RX ADMIN — ACETAMINOPHEN 650 MG: 325 TABLET ORAL at 08:02

## 2023-02-05 RX ADMIN — SODIUM CHLORIDE 8 MG/HR: 900 INJECTION INTRAVENOUS at 09:02

## 2023-02-05 RX ADMIN — METOCLOPRAMIDE 10 MG: 5 INJECTION, SOLUTION INTRAMUSCULAR; INTRAVENOUS at 12:02

## 2023-02-05 RX ADMIN — LIDOCAINE HYDROCHLORIDE 50 MG: 20 INJECTION, SOLUTION EPIDURAL; INFILTRATION; INTRACAUDAL; PERINEURAL at 02:02

## 2023-02-05 RX ADMIN — PROPOFOL 50 MG: 10 INJECTION, EMULSION INTRAVENOUS at 02:02

## 2023-02-05 RX ADMIN — SODIUM CHLORIDE, SODIUM LACTATE, POTASSIUM CHLORIDE, AND CALCIUM CHLORIDE: .6; .31; .03; .02 INJECTION, SOLUTION INTRAVENOUS at 01:02

## 2023-02-05 RX ADMIN — SODIUM CHLORIDE 8 MG/HR: 9 INJECTION, SOLUTION INTRAVENOUS at 04:02

## 2023-02-05 RX ADMIN — PROPOFOL 40 MG: 10 INJECTION, EMULSION INTRAVENOUS at 02:02

## 2023-02-05 RX ADMIN — CHLORHEXIDINE GLUCONATE 15 ML: 1.2 RINSE ORAL at 08:02

## 2023-02-05 RX ADMIN — PROPOFOL 100 MG: 10 INJECTION, EMULSION INTRAVENOUS at 02:02

## 2023-02-05 RX ADMIN — ACETAMINOPHEN 650 MG: 325 TABLET ORAL at 07:02

## 2023-02-05 NOTE — NURSING
Pt provided me with number to contact his significant other, Treasure cell number [phone]. Pt requests I notify her of pending transfer to room 2204 at ICU [facility]. Contacted treasure as pt requests and updated her. She states she will be going to visit pt at [facility] later today.

## 2023-02-05 NOTE — HOSPITAL COURSE
Patient without significant medical history admitted with fatigue, black tarry stools, and abdominal pain found to be acutely anemic with hemoglobin of 4.9. Tachycardic but otherwise stable vitals on admit. Initiated on GI bleed pathway. IV PPI given. 3u pRBC ordered. Shortly after beginning the 1st unit of blood, the patient developed a transfusion reaction consisting of fever and a seizure which was aborted with ativan. He remained drowsy post-ictal and post-ativan but was fully oriented. CT head w/o without acute findings. He was able to complete 1 unit of blood with repeat hemoglobin up to 5.4. GI team assessed the patient and did not feel comfortable keeping patient at this facility due to lack of ICU and would prefer to perform endoscopy where there was ICU availability. Remained tachycardic with stable blood pressure. Transfer was initiated for higher level of care and he was accepted at [facility]. Patient seen and examined on day of discharge at 0800.    Physical exam on day of discharge:  General - Patient alert and oriented x4 in NAD with tired-appearance  CV - Tachycardic, regular rhythm, No Murmur/otoniel/rubs  Resp - Lungs CTA Bilaterally, No increased WOB  GI - BS normoactive, soft, non-tender/non-distended, no HSM  Extrem-  No cyanosis, clubbing, edema.   Skin -  No masses, rashes or lesions noted on cursory skin exam.

## 2023-02-05 NOTE — NURSING
Received call from [facility] that pt accepted to [facility] ICU room # 2204. Provided updated vital signs-pt stable and verbalized understanding of pendin transfer. [facility] states call report to receiving ICU Nurse at [facility] @ [phone]. [facility] states they are currently setting up transportation via EMS now,.    Report called to ICU nurse Shanna. Awaiting transfer via EMS to arrive. Pt stable denies complaints.

## 2023-02-05 NOTE — PLAN OF CARE
Awake, alert, oriented X 4. Respirations unlabored. Abdomen soft. Denies nausea. EGD completed at bedside. See procedure note. 1 unit PRBC's infusing without complications. Orders to receive 2nd unit. H/H q8 hours. Safety maintained.

## 2023-02-05 NOTE — TRANSFER OF CARE
Anesthesia Transfer of Care Note    Patient: Galdino Burrell    Procedure(s) Performed: Procedure(s) (LRB):  EGD (ESOPHAGOGASTRODUODENOSCOPY) (N/A)    Patient location: ICU    Anesthesia Type: MAC    Transport from OR: Transported from OR on room air with adequate spontaneous ventilation    Post pain: adequate analgesia    Post assessment: no apparent anesthetic complications    Post vital signs: stable    Level of consciousness: sedated    Nausea/Vomiting: no nausea/vomiting    Complications: none    Transfer of care protocol was followed      Last vitals:   Visit Vitals  /76   Pulse 80   Temp 37.8 °C (100.1 °F)   Resp 19   SpO2 100%

## 2023-02-05 NOTE — SUBJECTIVE & OBJECTIVE
History reviewed. No pertinent past medical history.    History reviewed. No pertinent surgical history.    Review of patient's allergies indicates:  No Known Allergies    No current facility-administered medications on file prior to encounter.     Current Outpatient Medications on File Prior to Encounter   Medication Sig    ondansetron (ZOFRAN-ODT) 4 MG TbDL Take 1 tablet (4 mg total) by mouth every 8 (eight) hours as needed (nausea/vomiting).     Family History       Family history is unknown by patient.          Tobacco Use    Smoking status: Never    Smokeless tobacco: Never   Substance and Sexual Activity    Alcohol use: Not Currently    Drug use: Never    Sexual activity: Yes     Partners: Female     Review of Systems   Constitutional:  Positive for activity change and appetite change. Negative for chills, diaphoresis and fever.   HENT:  Negative for congestion, nosebleeds and tinnitus.    Eyes:  Negative for photophobia and visual disturbance.   Respiratory:  Negative for cough, chest tightness, shortness of breath and wheezing.    Cardiovascular:  Negative for chest pain, palpitations and leg swelling.   Gastrointestinal:  Positive for abdominal pain, anal bleeding and nausea. Negative for abdominal distention, constipation, diarrhea and vomiting.   Endocrine: Negative for cold intolerance and heat intolerance.   Genitourinary:  Negative for difficulty urinating, dysuria, frequency, hematuria and urgency.   Musculoskeletal:  Negative for arthralgias, back pain and myalgias.   Skin:  Positive for color change. Negative for pallor, rash and wound.   Allergic/Immunologic: Negative for immunocompromised state.   Neurological:  Positive for weakness. Negative for dizziness, tremors, facial asymmetry and speech difficulty.   Hematological:  Negative for adenopathy. Does not bruise/bleed easily.   Psychiatric/Behavioral:  Negative for confusion and sleep disturbance. The patient is not nervous/anxious.     Objective:     Vital Signs (Most Recent):  Temp: 100.1 °F (37.8 °C) (02/04/23 2358)  Pulse: 110 (02/05/23 0012)  Resp: 18 (02/04/23 2349)  BP: (!) 106/55 (02/05/23 0012)  SpO2: 100 % (02/05/23 0012)   Vital Signs (24h Range):  Temp:  [98.5 °F (36.9 °C)-100.1 °F (37.8 °C)] 100.1 °F (37.8 °C)  Pulse:  [] 110  Resp:  [18-20] 18  SpO2:  [98 %-100 %] 100 %  BP: (104-148)/(53-68) 106/55     Weight: 98.9 kg (218 lb)  Body mass index is 29.57 kg/m².    Physical Exam  Vitals and nursing note reviewed.   Constitutional:       General: He is not in acute distress.     Appearance: He is well-developed. He is ill-appearing. He is not diaphoretic.   HENT:      Head: Normocephalic.      Mouth/Throat:      Mouth: Mucous membranes are dry.   Eyes:      General: No scleral icterus.     Pupils: Pupils are equal, round, and reactive to light.      Comments: Conjunctival pallor   Neck:      Vascular: No JVD.   Cardiovascular:      Rate and Rhythm: Regular rhythm. Tachycardia present.      Heart sounds: Normal heart sounds. No murmur heard.    No friction rub. No gallop.   Pulmonary:      Effort: Pulmonary effort is normal. No respiratory distress.      Breath sounds: Normal breath sounds. No wheezing or rales.   Abdominal:      General: Bowel sounds are normal. There is no distension.      Palpations: Abdomen is soft.      Tenderness: There is no abdominal tenderness. There is no guarding or rebound.   Musculoskeletal:         General: No tenderness. Normal range of motion.      Cervical back: Normal range of motion and neck supple.   Lymphadenopathy:      Cervical: No cervical adenopathy.   Skin:     General: Skin is warm and dry.      Capillary Refill: Capillary refill takes 2 to 3 seconds.      Coloration: Skin is pale.      Findings: No erythema or rash.   Neurological:      Mental Status: He is alert and oriented to person, place, and time.      Cranial Nerves: No cranial nerve deficit.      Sensory: No sensory deficit.       Coordination: Coordination normal.      Deep Tendon Reflexes: Reflexes normal.   Psychiatric:         Mood and Affect: Mood normal.         Behavior: Behavior normal.         Thought Content: Thought content normal.         Judgment: Judgment normal.         CRANIAL NERVES     CN III, IV, VI   Pupils are equal, round, and reactive to light.     Significant Labs: All pertinent labs within the past 24 hours have been reviewed.  CBC:   Recent Labs   Lab 02/04/23 2120   WBC 12.73*   HGB 4.9*   HCT 15.8*        CMP:   Recent Labs   Lab 02/04/23 2120 02/04/23  2305    141   K 3.6 4.0    112*   CO2 23 23   * 114*   BUN 22* 23*   CREATININE 0.9 0.9   CALCIUM 7.9* 7.3*   PROT 5.0* 4.2*   ALBUMIN 2.9* 2.4*   BILITOT 0.2 0.1   ALKPHOS 51* 45*   AST 17 16   ALT 26 20   ANIONGAP 7* 6*       Significant Imaging: I have reviewed all pertinent imaging results/findings within the past 24 hours.

## 2023-02-05 NOTE — ED PROVIDER NOTES
Encounter Date: 2/4/2023    SCRIBE #1 NOTE: I, Kraig Monica, am scribing for, and in the presence of,  Eligio Moyer III, MD.     History     Chief Complaint   Patient presents with    Vomiting     For the past couple weeks post flu. Feels dehydrated. Concerns regarding possible STD      Time seen by provider: 9:02 PM on 02/04/2023    Galdino Burrell is a 27 y.o. male who presents to the ED with an onset of intermittent headache that began a week ago, but worsened tonight, as well as intermittent palpitations, dizziness, nausea, chills, dark stools, and vomiting that began in the last week. He states the headache is across his forehead. The patient has never had issues with headaches in the past. The patient had the flu 2 weeks ago, and states that all of his symptoms began after having the flu. The patient has taken medications for his nausea but they have not helped. The patient does not routinely use medications like Advil and does not have a history of stomach ulcers. The patient drinks alcohol occasionally, but has not done so in three weeks. The patient denies fever, speech difficulty, vision changes, congestion, sore throat, abdominal pain or any other symptoms at this time. There is no recorded PMHx or PSHx.      Review of patient's allergies indicates:  No Known Allergies  History reviewed. No pertinent past medical history.  History reviewed. No pertinent surgical history.  History reviewed. No pertinent family history.     Review of Systems   Constitutional:  Positive for chills. Negative for activity change, appetite change, fatigue and fever.   HENT:  Negative for congestion and sore throat.    Eyes:  Negative for visual disturbance.   Respiratory:  Negative for apnea and shortness of breath.    Cardiovascular:  Positive for palpitations. Negative for chest pain.   Gastrointestinal:  Positive for blood in stool, nausea and vomiting. Negative for abdominal distention and abdominal pain.    Genitourinary:  Negative for difficulty urinating.   Musculoskeletal:  Negative for neck pain.   Skin:  Negative for pallor and rash.   Neurological:  Positive for dizziness and headaches. Negative for speech difficulty.   Hematological:  Does not bruise/bleed easily.   Psychiatric/Behavioral:  Negative for agitation.      Physical Exam     Initial Vitals [02/04/23 2051]   BP Pulse Resp Temp SpO2   (!) 137/56 80 18 98.5 °F (36.9 °C) 98 %      MAP       --         Physical Exam    Nursing note and vitals reviewed.  Constitutional: He appears well-developed and well-nourished.   HENT:   Head: Normocephalic and atraumatic.   Eyes: Conjunctivae are normal.   Neck: Neck supple.   Normal range of motion.  Cardiovascular:  Regular rhythm and normal heart sounds.   Tachycardia present.   Exam reveals no gallop and no friction rub.       No murmur heard.  Pulmonary/Chest: Breath sounds normal. No respiratory distress. He has no wheezes. He has no rhonchi. He has no rales.   Abdominal: Abdomen is soft. He exhibits no distension. There is no abdominal tenderness.   Musculoskeletal:         General: Normal range of motion.      Cervical back: Normal range of motion and neck supple.     Neurological: He is alert and oriented to person, place, and time.   Skin: Skin is warm and dry.   Psychiatric: He has a normal mood and affect.       ED Course   Critical Care    Date/Time: 2/4/2023 10:09 PM  Performed by: Eligio Moyer III, MD  Authorized by: Eligio Moyer III, MD   Direct patient critical care time: 120 minutes  Total critical care time (exclusive of procedural time) : 120 minutes  Critical care was necessary to treat or prevent imminent or life-threatening deterioration of the following conditions: GI bleed requiring multi unit transfusion.  Critical care was time spent personally by me on the following activities: development of treatment plan with patient or surrogate, discussions with primary provider, evaluation  of patient's response to treatment, examination of patient, obtaining history from patient or surrogate, ordering and performing treatments and interventions, ordering and review of laboratory studies, ordering and review of radiographic studies, re-evaluation of patient's condition and review of old charts.      Labs Reviewed   CBC W/ AUTO DIFFERENTIAL - Abnormal; Notable for the following components:       Result Value    WBC 12.73 (*)     RBC 1.72 (*)     Hemoglobin 4.9 (*)     Hematocrit 15.8 (*)     MCHC 31.0 (*)     RDW 15.2 (*)     Immature Granulocytes 0.9 (*)     Gran # (ANC) 8.2 (*)     Immature Grans (Abs) 0.11 (*)     nRBC 1 (*)     All other components within normal limits    Narrative:     H&H    critical result(s) called and verbal readback obtained from   Dolores Perez RN.  by TH1 02/04/2023 21:35   COMPREHENSIVE METABOLIC PANEL - Abnormal; Notable for the following components:    Glucose 135 (*)     BUN 22 (*)     Calcium 7.9 (*)     Total Protein 5.0 (*)     Albumin 2.9 (*)     Alkaline Phosphatase 51 (*)     Anion Gap 7 (*)     All other components within normal limits   HIV 1 / 2 ANTIBODY   HEPATITIS C ANTIBODY   TYPE & SCREEN   PREPARE RBC SOFT          Imaging Results    None          Medications   0.9%  NaCl infusion (for blood administration) (has no administration in time range)   pantoprazole injection 40 mg (has no administration in time range)   0.9%  NaCl infusion ( Intravenous New Bag 2/4/23 2131)   ketorolac injection 30 mg (30 mg Intravenous Given 2/4/23 2132)   butalbital-acetaminophen-caffeine -40 mg per tablet 1 tablet (1 tablet Oral Given 2/4/23 2133)   acetaminophen tablet 325 mg (325 mg Oral Given 2/4/23 2132)   pantoprazole EC tablet 40 mg (40 mg Oral Given 2/4/23 2115)   metoclopramide HCl injection 10 mg (10 mg Intravenous Given 2/4/23 2134)   aluminum-magnesium hydroxide-simethicone 200-200-20 mg/5 mL suspension 5 mL (5 mLs Oral Given 2/4/23 2202)     Medical  Decision Making:   History:   Old Medical Records: I decided to obtain old medical records.  Clinical Tests:   Lab Tests: Ordered and Reviewed  ED Management:  27-year-old male presents with epigastric pain with associated nausea vomiting.  He is found to have melena with profound anemia suggestive of a peptic ulcer.  He will be admitted for blood transfusion and proton pump inhibitor therapy.     APC / Resident Notes:   I, Dr. Eligio Moyer III, personally performed the services described in this documentation. All medical record entries made by the scribe were at my direction and in my presence.  I have reviewed the chart and agree that the record reflects my personal performance and is accurate and complete   Scribe Attestation:   Scribe #1: I performed the above scribed service and the documentation accurately describes the services I performed. I attest to the accuracy of the note.                   Clinical Impression:   Final diagnoses:  [K25.4] Gastrointestinal hemorrhage associated with gastric ulcer (Primary)  [D50.0] Iron deficiency anemia due to chronic blood loss               Eligio Moyer III, MD  02/04/23 1937

## 2023-02-05 NOTE — PLAN OF CARE
[facility]  Initial Discharge Assessment       Primary Care Provider: Primary Doctor No    Admission Diagnosis: Transfusion reaction [T80.92XA]  Iron deficiency anemia due to chronic blood loss [D50.0]  Gastrointestinal hemorrhage associated with gastric ulcer [K25.4]    Admission Date: 2/4/2023  Expected Discharge Date: 2/5/2023    SW met with pt at bedside to complete discharge assessment, verified information on facesheet and pharmacy.  Pt has no PCP but willing to establish care at [facility] in [facility].  No HH, DME or dialysis.  Pt's spouse will drive him home.  No needs identified at this time.    Discharge Barriers Identified: None    Payor:  / Plan:  [employer] / Product Type: Government /     Extended Emergency Contact Information  Primary Emergency Contact: Ana Laura Banegas  Mobile Phone: [phone]  Relation: Spouse  Preferred language: English   needed? No    Discharge Plan A: Home  Discharge Plan B: Home      [id number] - [place] - [address]  [address] [address]  Phone: [phone] Fax: [phone]      Initial Assessment (most recent)       Adult Discharge Assessment - 02/05/23 1134          Discharge Assessment    Assessment Type Discharge Planning Assessment     Confirmed/corrected address, phone number and insurance Yes     Confirmed Demographics Correct on Facesheet     Source of Information patient     Communicated ROSA with patient/caregiver No     People in Home spouse;child(edgardo), dependent     Do you expect to return to your current living situation? Yes     Prior to hospitilization cognitive status: Alert/Oriented     Current cognitive status: Alert/Oriented     Equipment Currently Used at Home none     Readmission within 30 days? No     Patient currently being followed by outpatient case management? No     Do you currently have service(s) that help you manage your care at home? No     Do you take  prescription medications? Yes     Do you have prescription coverage? Yes     Coverage      Do you have any problems affording any of your prescribed medications? No     Is the patient taking medications as prescribed? yes     Who is going to help you get home at discharge? self     How do you get to doctors appointments? car, drives self     Are you on dialysis? No     Do you take coumadin? No     Discharge Plan A Home     Discharge Plan B Home     DME Needed Upon Discharge  none     Discharge Plan discussed with: Patient     Discharge Barriers Identified None        Physical Activity    On average, how many days per week do you engage in moderate to strenuous exercise (like a brisk walk)? 2 days     On average, how many minutes do you engage in exercise at this level? 40 min        Financial Resource Strain    How hard is it for you to pay for the very basics like food, housing, medical care, and heating? Not hard at all        Housing Stability    In the last 12 months, was there a time when you were not able to pay the mortgage or rent on time? No     In the last 12 months, was there a time when you did not have a steady place to sleep or slept in a shelter (including now)? No        Transportation Needs    In the past 12 months, has lack of transportation kept you from medical appointments or from getting medications? No     In the past 12 months, has lack of transportation kept you from meetings, work, or from getting things needed for daily living? No        Food Insecurity    Within the past 12 months, you worried that your food would run out before you got the money to buy more. Never true     Within the past 12 months, the food you bought just didn't last and you didn't have money to get more. Never true        Social Connections    In a typical week, how many times do you talk on the phone with family, friends, or neighbors? More than three times a week     How often do you get together with friends  or relatives? --   not often    How often do you attend Religion or Orthodoxy services? Never     Do you belong to any clubs or organizations such as Religion groups, unions, fraternal or athletic groups, or school groups? No     How often do you attend meetings of the clubs or organizations you belong to? Never     Are you , , , , never , or living with a partner?         Alcohol Use    Q1: How often do you have a drink containing alcohol? Never

## 2023-02-05 NOTE — EICU
Intervention Initiated From:  [facility] / EICU    aMrycruz intervened regarding:  Rounding (Video assessment)    Nurse Notified:  Yes    Doctor Notified:  No    Comments: Rounding. Side rails up x3, padded. Patient drowsy easily aroused. RBC infusing without adverse effects. Previous unit reaction workup negative. On Protonix @ 8 mg/hour. B/P 117/57, , resp 20, sat 100

## 2023-02-05 NOTE — [employer] PATIENT INSTRUCTIONS
Discharge Summary/Instructions after an Endoscopic Procedure  Patient Name: Galdino Burrell  Patient MRN: [id number]  Patient YOB: 1995 Sunday, February 5, 2023  Carlos Echeverria MD  RESTRICTIONS:  During your procedure today, you received medications for sedation.  These   medications may affect your judgment, balance and coordination.  Therefore,   for 24 hours, you have the following restrictions:   - DO NOT drive a car, operate machinery, make legal/financial decisions,   sign important papers or drink alcohol.    ACTIVITY:  Today: no heavy lifting, straining or running due to procedural   sedation/anesthesia.  The following day: return to full activity including work.  DIET:  Eat and drink normally unless instructed otherwise.     TREATMENT FOR COMMON SIDE EFFECTS:  - Mild abdominal pain, nausea, belching, bloating or excessive gas:  rest,   eat lightly and use a heating pad.  - Sore Throat: treat with throat lozenges and/or gargle with warm salt   water.  - Because air was used during the procedure, expelling large amounts of air   from your rectum or belching is normal.  - If a bowel prep was taken, you may not have a bowel movement for 1-3 days.    This is normal.  SYMPTOMS TO WATCH FOR AND REPORT TO YOUR PHYSICIAN:  1. Abdominal pain or bloating, other than gas cramps.  2. Chest pain.  3. Back pain.  4. Signs of infection such as: chills or fever occurring within 24 hours   after the procedure.  5. Rectal bleeding, which would show as bright red, maroon, or black stools.   (A tablespoon of blood from the rectum is not serious, especially if   hemorrhoids are present.)  6. Vomiting.  7. Weakness or dizziness.  GO DIRECTLY TO THE NEAREST EMERGENCY ROOM IF YOU HAVE ANY OF THE FOLLOWING:      Difficulty breathing              Chills and/or fever over 101 F   Persistent vomiting and/or vomiting blood   Severe abdominal pain   Severe chest pain   Black, tarry stools   Bleeding- more than one  tablespoon   Any other symptom or condition that you feel may need urgent attention  Your doctor recommends these additional instructions:  If any biopsies were taken, your doctors clinic will contact you in 1 to 2   weeks with any results.  - Resume previous diet.   - Continue present medications.   - Return patient to ICU for ongoing care. Continue IV PPI infusion for today   and transfuse Hb >7. If doing OK tomorrow, can transition to BID PPI for 8   weeks.  - Await pathology results.  For questions, problems or results please call your physician - Carlos Echeverria MD at Work:  [phone].  [facility], EMERGENCY ROOM PHONE NUMBER: [phone]  IF A COMPLICATION OR EMERGENCY SITUATION ARISES AND YOU ARE UNABLE TO REACH   YOUR PHYSICIAN - GO DIRECTLY TO THE EMERGENCY ROOM.  Carlos Echeverria MD  2/5/2023 2:56:56 PM  This report has been verified and signed electronically.  Dear patient,  As a result of recent federal legislation (The Federal Cures Act), you may   receive lab or pathology results from your procedure in your [facility]   account before your physician is able to contact you. Your physician or   their representative will relay the results to you with their   recommendations at their soonest availability.  Thank you,  [employer]

## 2023-02-05 NOTE — NURSING
EMS on unit. Tx pt off unit via stretcher per ems. Vitals stable. Pt denies complaints. Continuous IV Protonix at 8mg/hr maintained during transfer. Pt requesting to use bathroom-clean catch urine specimen obtained at this time as well. Gathered his belonging's including cell phone and placed in bag with pt at time of transfer off unit at 10:03 AM.

## 2023-02-05 NOTE — HPI
Mr. Burrell is a 27-year-old male who presented as a direct admission from [facility] for ICU level of care.  Patient reports about 2 weeks ago he was diagnosed with influenza, he was treated with anti nausea and Tamiflu.  He reports 2 week history of progressively worsening generalized weakness with fatigue.  Reports over the last week he was experiencing migraine type headache, nausea with episodes of emesis, did not visualize any bright red blood in emesis, as well as black stool with small amount of bright red blood.  Had mild associated epigastric abdominal discomfort, he felt this was due to reduced oral intake.  Denies any NSAID use.  He has never required blood transfusions in the past.  No past medical history, remote orthopedic procedure, only occasional alcohol use, nonsmoker, no recreational drugs.  At outside hospital on presentation H&H 5/1.5, previous labs 1/22/23 H&H 13.4/42.5.  He was admitted at outside hospital with IV PPI infusion and plans for 3 units PRBC transfusion with GI consultation.  On 1st unit transfusion patient had fever 101.3, with associated seizure activity, transfusion was stopped, transfusion workup was submitted, he responded to Versed, CT head no acute.  This unit of blood was discarded and patient was subsequently transfused 2nd unit PRBC.  He was seen by physicians this morning and concern for ICU level of care and therefore he was transferred to [facility].  He denies fever prior to hospital presentation, states he was experiencing intermittent palpitation, states he remembers feeling warm, lightheaded/dizzy but does not fully recollect further events of last night.  He is currently alert and oriented, no focal deficits.

## 2023-02-05 NOTE — ASSESSMENT & PLAN NOTE
Acute problem   GI bleed pathway   Transfusing 3 units of packed red blood cells   Protonix infusion   GI consult in a.m.  Trend CBC

## 2023-02-05 NOTE — NURSING
Nurses Note -- 4 Eyes      2/5/2023   5:27 AM      Skin assessed during: Admit      [x] No Pressure Injuries Present    [x]Prevention Measures Documented      [] Yes- Altered Skin Integrity Present or Discovered   [] LDA Added if Not in Epic (Describe Wound)   [] New Altered Skin Integrity was Present on Admit and Documented in LDA   [] Wound Image Taken    Wound Care Consulted? No    Attending Nurse:  Gely Powers RN     Second RN/Staff Member:  Sakina Badillo RN

## 2023-02-05 NOTE — NURSING
"Awake, alert and oriented to person, place, month and year. Reports "feel funny". Unable to describe anything other than vague symptoms. 20g to rt a/c with IVF infusing, site free of s/s of infiltration. B/P taken 137/56 with all other vital signs stable. MD at door. Patient then speaking with MD.  "

## 2023-02-05 NOTE — DISCHARGE SUMMARY
[facility] Medicine  Discharge Summary      Patient Name: Galdino Burrell  MRN: [id number]  KAYCE: [id number]  Patient Class: IP- Inpatient  Admission Date: 2/4/2023  Hospital Length of Stay: 1 days  Discharge Date and Time:  02/05/2023 8:32 AM  Attending Physician: Rivera Summers MD   Discharging Provider: Rivera Summers MD  Primary Care Provider: Primary Doctor Naheed    Primary Care Team: Networked reference to record PCT     HPI:   Galdino Burrell is a 27-year-old male who presents emergency room complaining of generalized weakness and fatigue.  Of note patient was recently diagnosed with influenza and started on Tamiflu which she completed.  He reports profound weakness and fatigue onset approximately 2 weeks ago and progressively worsened.  He also endorses black tarry stools.  He reports in periumbilical pain described as a cramping sensation.  That pain is alleviated with the eating.  He denies any alcohol use.  He denies any acetaminophen or ibuprofen use.  He is a nonsmoker.  ER workup:  CBC with anemia of 5 and 15.  Leukocytosis of 12,000.  CMP with BUN of 23 creatinine is 0.9 and albumin of 2.9 ER physician reported gross blood on rectal exam.  Patient admitted to Hospital Medicine for treatment management.  Will start patient on GI bleed pathway.  Start patient on Protonix infusion as well as transfused 3 units of packed red cells.  GI consult in a.m.      * No surgery found *      Hospital Course:   Patient without significant medical history admitted with fatigue, black tarry stools, and abdominal pain found to be acutely anemic with hemoglobin of 4.9. Tachycardic but otherwise stable vitals on admit. Initiated on GI bleed pathway. IV PPI given. 3u pRBC ordered. Shortly after beginning the 1st unit of blood, the patient developed a transfusion reaction consisting of fever and a seizure which was aborted with ativan. He remained drowsy post-ictal and post-ativan but was fully  oriented. CT head w/o without acute findings. He was able to complete 1 unit of blood with repeat hemoglobin up to 5.4. GI team assessed the patient and did not feel comfortable keeping patient at this facility due to lack of ICU and would prefer to perform endoscopy where there was ICU availability. Remained tachycardic with stable blood pressure. Transfer was initiated for higher level of care and he was accepted at [facility]. Patient seen and examined on day of discharge at 0800.    Physical exam on day of discharge:  General - Patient alert and oriented x4 in NAD with tired-appearance  CV - Tachycardic, regular rhythm, No Murmur/otoniel/rubs  Resp - Lungs CTA Bilaterally, No increased WOB  GI - BS normoactive, soft, non-tender/non-distended, no HSM  Extrem-  No cyanosis, clubbing, edema.   Skin -  No masses, rashes or lesions noted on cursory skin exam.         Goals of Care Treatment Preferences:  Code Status: Full Code      Consults:   Consults (From admission, onward)        Status Ordering Provider     Inpatient consult to Neurology  Once        Provider:  China Arredondo MD    Acknowledged KULDEEP BONNER     Inpatient consult to Gastroenterology  Once        Provider:  Juan Ramon Callahan MD    Acknowledged KULDEEP BONNER          No new Assessment & Plan notes have been filed under this hospital service since the last note was generated.  Service: Hospital Medicine    Final Active Diagnoses:    Diagnosis Date Noted POA    PRINCIPAL PROBLEM:  Acute blood loss anemia [D62] 02/05/2023 Yes    Transfusion reaction [T80.92XA] 02/05/2023 No      Problems Resolved During this Admission:       Discharged Condition: good    Disposition:  transfer to short term hospital      Significant Diagnostic Studies:     Admission on 02/04/2023   Component Date Value Ref Range Status    WBC 02/04/2023 12.73 (H)  3.90 - 12.70 K/uL Final    RBC 02/04/2023 1.72 (L)  4.60 - 6.20 M/uL Final     Hemoglobin 02/04/2023 4.9 (LL)  14.0 - 18.0 g/dL Final    Comment: H&H    critical result(s) called and verbal readback obtained from   Dolores Perez RN.  by TH1 02/04/2023 21:35      Hematocrit 02/04/2023 15.8 (LL)  40.0 - 54.0 % Final    Comment: H&H    critical result(s) called and verbal readback obtained from   Dolores Perez RN.  by TH1 02/04/2023 21:35      MCV 02/04/2023 92  82 - 98 fL Final    MCH 02/04/2023 28.5  27.0 - 31.0 pg Final    MCHC 02/04/2023 31.0 (L)  32.0 - 36.0 g/dL Final    RDW 02/04/2023 15.2 (H)  11.5 - 14.5 % Final    Platelets 02/04/2023 153  150 - 450 K/uL Final    MPV 02/04/2023 11.1  9.2 - 12.9 fL Final    Immature Granulocytes 02/04/2023 0.9 (H)  0.0 - 0.5 % Final    Gran # (ANC) 02/04/2023 8.2 (H)  1.8 - 7.7 K/uL Final    Immature Grans (Abs) 02/04/2023 0.11 (H)  0.00 - 0.04 K/uL Final    Comment: Mild elevation in immature granulocytes is non specific and   can be seen in a variety of conditions including stress response,   acute inflammation, trauma and pregnancy. Correlation with other   laboratory and clinical findings is essential.      Lymph # 02/04/2023 3.7  1.0 - 4.8 K/uL Final    Mono # 02/04/2023 0.6  0.3 - 1.0 K/uL Final    Eos # 02/04/2023 0.1  0.0 - 0.5 K/uL Final    Baso # 02/04/2023 0.04  0.00 - 0.20 K/uL Final    nRBC 02/04/2023 1 (A)  0 /100 WBC Final    Gran % 02/04/2023 64.4  38.0 - 73.0 % Final    Lymph % 02/04/2023 29.0  18.0 - 48.0 % Final    Mono % 02/04/2023 4.3  4.0 - 15.0 % Final    Eosinophil % 02/04/2023 1.1  0.0 - 8.0 % Final    Basophil % 02/04/2023 0.3  0.0 - 1.9 % Final    Platelet Estimate 02/04/2023 Appears normal   Final    Differential Method 02/04/2023 Automated   Final    Sodium 02/04/2023 140  136 - 145 mmol/L Final    Potassium 02/04/2023 3.6  3.5 - 5.1 mmol/L Final    Chloride 02/04/2023 110  95 - 110 mmol/L Final    CO2 02/04/2023 23  23 - 29 mmol/L Final    Glucose 02/04/2023 135 (H)  70 - 110 mg/dL Final     BUN 02/04/2023 22 (H)  6 - 20 mg/dL Final    Creatinine 02/04/2023 0.9  0.5 - 1.4 mg/dL Final    Calcium 02/04/2023 7.9 (L)  8.7 - 10.5 mg/dL Final    Total Protein 02/04/2023 5.0 (L)  6.0 - 8.4 g/dL Final    Albumin 02/04/2023 2.9 (L)  3.5 - 5.2 g/dL Final    Total Bilirubin 02/04/2023 0.2  0.1 - 1.0 mg/dL Final    Comment: For infants and newborns, interpretation of results should be based  on gestational age, weight and in agreement with clinical  observations.    Premature Infant recommended reference ranges:  Up to 24 hours.............<8.0 mg/dL  Up to 48 hours............<12.0 mg/dL  3-5 days..................<15.0 mg/dL  6-29 days.................<15.0 mg/dL      Alkaline Phosphatase 02/04/2023 51 (L)  55 - 135 U/L Final    AST 02/04/2023 17  10 - 40 U/L Final    ALT 02/04/2023 26  10 - 44 U/L Final    Anion Gap 02/04/2023 7 (L)  8 - 16 mmol/L Final    eGFR 02/04/2023 >60  >60 mL/min/1.73 m^2 Final    Group & Rh 02/04/2023 O POS   Final    Indirect Grupo 02/04/2023 NEG   Final    UNIT NUMBER 02/04/2023 [id number]   Final    Product Code 02/04/2023 [id number]   Final    DISPENSE STATUS 02/04/2023 TRANSFUSED   Final    CODING SYSTEM 02/04/2023 [id number]   Final    Unit Blood Type Code 02/04/2023 5100   Final    Unit Blood Type 02/04/2023 O POS   Final    Unit Expiration 02/04/2023 [id number]   Final    UNIT NUMBER 02/04/2023 [id number]   Preliminary    Product Code 02/04/2023 T6929M76   Preliminary    DISPENSE STATUS 02/04/2023 ISSUED   Preliminary    CODING SYSTEM 02/04/2023 [id number]   Preliminary    Unit Blood Type Code 02/04/2023 5100   Preliminary    Unit Blood Type 02/04/2023 O POS   Preliminary    Unit Expiration 02/04/2023 [id number]   Preliminary    UNIT NUMBER 02/04/2023 [id number]   Preliminary    Product Code 02/04/2023 [id number]   Preliminary    DISPENSE STATUS 02/04/2023 [id number]   Preliminary    CODING SYSTEM 02/04/2023 [id number]   Preliminary    Unit  Blood Type Code 02/04/2023 5100   Preliminary    Unit Blood Type 02/04/2023 O POS   Preliminary    Unit Expiration 02/04/2023 [id number]   Preliminary    Sodium 02/04/2023 141  136 - 145 mmol/L Final    Potassium 02/04/2023 4.0  3.5 - 5.1 mmol/L Final    Chloride 02/04/2023 112 (H)  95 - 110 mmol/L Final    CO2 02/04/2023 23  23 - 29 mmol/L Final    Glucose 02/04/2023 114 (H)  70 - 110 mg/dL Final    BUN 02/04/2023 23 (H)  6 - 20 mg/dL Final    Creatinine 02/04/2023 0.9  0.5 - 1.4 mg/dL Final    Calcium 02/04/2023 7.3 (L)  8.7 - 10.5 mg/dL Final    Total Protein 02/04/2023 4.2 (L)  6.0 - 8.4 g/dL Final    Albumin 02/04/2023 2.4 (L)  3.5 - 5.2 g/dL Final    Total Bilirubin 02/04/2023 0.1  0.1 - 1.0 mg/dL Final    Comment: For infants and newborns, interpretation of results should be based  on gestational age, weight and in agreement with clinical  observations.    Premature Infant recommended reference ranges:  Up to 24 hours.............<8.0 mg/dL  Up to 48 hours............<12.0 mg/dL  3-5 days..................<15.0 mg/dL  6-29 days.................<15.0 mg/dL      Alkaline Phosphatase 02/04/2023 45 (L)  55 - 135 U/L Final    AST 02/04/2023 16  10 - 40 U/L Final    ALT 02/04/2023 20  10 - 44 U/L Final    Anion Gap 02/04/2023 6 (L)  8 - 16 mmol/L Final    eGFR 02/04/2023 >60  >60 mL/min/1.73 m^2 Final    WBC 02/05/2023 13.05 (H)  3.90 - 12.70 K/uL Final    RBC 02/05/2023 1.86 (L)  4.60 - 6.20 M/uL Final    Hemoglobin 02/05/2023 5.4 (LL)  14.0 - 18.0 g/dL Final    Comment: Hemoglobin and hematocrit critical result(s) called and verbal   readback obtained from Dennise Sessions by [facility] 02/05/2023 08:02      Hematocrit 02/05/2023 16.9 (LL)  40.0 - 54.0 % Final    Comment: Hemoglobin and hematocrit critical result(s) called and verbal   readback obtained from Dennise Sessions by [facility] 02/05/2023 08:02      MCV 02/05/2023 91  82 - 98 fL Final    MCH 02/05/2023 29.0  27.0 - 31.0 pg Final    MCHC  02/05/2023 32.0  32.0 - 36.0 g/dL Final    RDW 02/05/2023 15.1 (H)  11.5 - 14.5 % Final    Platelets 02/05/2023 135 (L)  150 - 450 K/uL Final    MPV 02/05/2023 11.0  9.2 - 12.9 fL Final    Immature Granulocytes 02/05/2023 0.7 (H)  0.0 - 0.5 % Final    Gran # (ANC) 02/05/2023 8.1 (H)  1.8 - 7.7 K/uL Final    Immature Grans (Abs) 02/05/2023 0.09 (H)  0.00 - 0.04 K/uL Final    Comment: Mild elevation in immature granulocytes is non specific and   can be seen in a variety of conditions including stress response,   acute inflammation, trauma and pregnancy. Correlation with other   laboratory and clinical findings is essential.      Lymph # 02/05/2023 3.9  1.0 - 4.8 K/uL Final    Mono # 02/05/2023 0.9  0.3 - 1.0 K/uL Final    Eos # 02/05/2023 0.0  0.0 - 0.5 K/uL Final    Baso # 02/05/2023 0.04  0.00 - 0.20 K/uL Final    nRBC 02/05/2023 2 (A)  0 /100 WBC Final    Gran % 02/05/2023 62.3  38.0 - 73.0 % Final    Lymph % 02/05/2023 30.0  18.0 - 48.0 % Final    Mono % 02/05/2023 6.5  4.0 - 15.0 % Final    Eosinophil % 02/05/2023 0.2  0.0 - 8.0 % Final    Basophil % 02/05/2023 0.3  0.0 - 1.9 % Final    Differential Method 02/05/2023 Automated   Final    WBC 02/05/2023 13.05 (H)  3.90 - 12.70 K/uL Final    RBC 02/05/2023 1.86 (L)  4.60 - 6.20 M/uL Final    Hemoglobin 02/05/2023 5.4 (LL)  14.0 - 18.0 g/dL Final    Comment: Hemoglobin and hematocrit critical result(s) called and verbal   readback obtained from [facility] by [facility] 02/05/2023 08:02      Hematocrit 02/05/2023 16.9 (LL)  40.0 - 54.0 % Final    Comment: Hemoglobin and hematocrit critical result(s) called and verbal   readback obtained from Dennise Sessions by [facility] 02/05/2023 08:02      MCV 02/05/2023 91  82 - 98 fL Final    MCH 02/05/2023 29.0  27.0 - 31.0 pg Final    MCHC 02/05/2023 32.0  32.0 - 36.0 g/dL Final    RDW 02/05/2023 15.1 (H)  11.5 - 14.5 % Final    Platelets 02/05/2023 135 (L)  150 - 450 K/uL Final    MPV 02/05/2023 11.0  9.2  - 12.9 fL Final    Immature Granulocytes 02/05/2023 0.7 (H)  0.0 - 0.5 % Final    Gran # (ANC) 02/05/2023 8.1 (H)  1.8 - 7.7 K/uL Final    Immature Grans (Abs) 02/05/2023 0.09 (H)  0.00 - 0.04 K/uL Final    Comment: Mild elevation in immature granulocytes is non specific and   can be seen in a variety of conditions including stress response,   acute inflammation, trauma and pregnancy. Correlation with other   laboratory and clinical findings is essential.      Lymph # 02/05/2023 3.9  1.0 - 4.8 K/uL Final    Mono # 02/05/2023 0.9  0.3 - 1.0 K/uL Final    Eos # 02/05/2023 0.0  0.0 - 0.5 K/uL Final    Baso # 02/05/2023 0.04  0.00 - 0.20 K/uL Final    nRBC 02/05/2023 2 (A)  0 /100 WBC Final    Gran % 02/05/2023 62.3  38.0 - 73.0 % Final    Lymph % 02/05/2023 30.0  18.0 - 48.0 % Final    Mono % 02/05/2023 6.5  4.0 - 15.0 % Final    Eosinophil % 02/05/2023 0.2  0.0 - 8.0 % Final    Basophil % 02/05/2023 0.3  0.0 - 1.9 % Final    Differential Method 02/05/2023 Automated   Final     Imaging Results          CT Head Without Contrast (Final result)  Result time 02/05/23 07:13:00    Final result by Giuseppe Potter MD (02/05/23 07:13:00)                 Impression:      1. No evidence of an acute intracranial abnormality.  The preliminary and final reports are concordant.      Electronically signed by: Giuseppe Potter  Date:    02/05/2023  Time:    07:13             Narrative:    EXAMINATION:  CT HEAD WITHOUT CONTRAST    CLINICAL HISTORY:  Seizure, new-onset, no history of trauma;    TECHNIQUE:  Low dose axial images were obtained through the head.  Coronal and sagittal reformations were also performed. Contrast was not administered.    COMPARISON:  None.    FINDINGS:  Ventricles and sulci are normal in size for age without evidence of hydrocephalus.    The brain parenchyma appears within normal limits.  No definite parenchymal mass, hemorrhage, edema or acute major vascular distribution infarct.    No extra-axial blood  or fluid collections.    No displaced calvarial fracture.    The mastoid air cells and visualized paranasal sinuses are essentially clear.                               X-Ray Chest AP Portable (Final result)  Result time 02/05/23 08:10:41   Procedure changed from X-Ray Chest PA And Lateral     Final result by Jasvir Rome MD (02/05/23 08:10:41)                 Impression:      No acute findings.      Electronically signed by: Jasvir Rome MD  Date:    02/05/2023  Time:    08:10             Narrative:    EXAMINATION:  XR CHEST AP PORTABLE    CLINICAL HISTORY:  pain; Unspecified transfusion reaction, initial encounter    TECHNIQUE:  Single frontal view of the chest was performed.    COMPARISON:  None    FINDINGS:  Cardiac and mediastinal silhouettes are within normal limits.    Lungs are clear bilaterally.                                Pending Diagnostic Studies:     Procedure Component Value Units Date/Time    Ammonia [[id number]]     Order Status: Sent Lab Status: No result     Specimen: Blood     Comprehensive Metabolic Panel [[id number]] Collected: 02/05/23 0752    Order Status: Sent Lab Status: In process Updated: 02/05/23 0757    Specimen: Blood     HIV 1/2 Ag/Ab (4th Gen) [[id number]] Collected: 02/04/23 2120    Order Status: Sent Lab Status: In process Updated: 02/04/23 2206    Specimen: Blood     Hepatitis C Antibody [[id number]] Collected: 02/04/23 2120    Order Status: Sent Lab Status: In process Updated: 02/04/23 2206    Specimen: Blood     Lactic Acid, Plasma [[id number]]     Order Status: Sent Lab Status: No result     Specimen: Blood          Medications:  Reconciled Home Medications:      Medication List      ASK your doctor about these medications    ondansetron 4 MG Tbdl  Commonly known as: ZOFRAN-ODT  Take 1 tablet (4 mg total) by mouth every 8 (eight) hours as needed (nausea/vomiting).            Indwelling Lines/Drains at time of discharge:   Lines/Drains/Airways     None                  Time spent on the discharge of patient: 35 minutes         Rivera Summers MD  Department of Hospital Medicine  [facility]

## 2023-02-05 NOTE — ANESTHESIA PREPROCEDURE EVALUATION
02/05/2023  Galdino Burrell is a 27 y.o., male.      Pre-op Assessment    I have reviewed the Patient Summary Reports.     I have reviewed the Nursing Notes. I have reviewed the NPO Status.   I have reviewed the Medications.     Review of Systems  Anesthesia Hx:  No problems with previous Anesthesia  Neg history of prior surgery. Denies Family Hx of Anesthesia complications.   Denies Personal Hx of Anesthesia complications.   Hematology/Oncology:  Hematology Normal   Oncology Normal     EENT/Dental:EENT/Dental Normal   Cardiovascular:  Cardiovascular Normal     Pulmonary:  Pulmonary Normal    Renal/:  Renal/ Normal     Hepatic/GI:  Hepatic/GI Normal    Musculoskeletal:  Musculoskeletal Normal    Neurological:  Neurology Normal    Endocrine:  Endocrine Normal    Dermatological:  Skin Normal    Psych:  Psychiatric Normal           Patient Active Problem List   Diagnosis    Acute blood loss anemia    Transfusion reaction    Concern for upper GI bleed    Symptomatic anemia    Fever    Thrombocytopenia       Past Surgical History:   Procedure Laterality Date    ORTHOPEDIC SURGERY          Tobacco Use:  The patient  reports that he has never smoked. He has never used smokeless tobacco.     No results found for this or any previous visit.          Lab Results   Component Value Date    WBC 13.05 (H) 02/05/2023    WBC 13.05 (H) 02/05/2023    HGB 5.4 (LL) 02/05/2023    HGB 5.4 (LL) 02/05/2023    HCT 16.9 (LL) 02/05/2023    HCT 16.9 (LL) 02/05/2023    MCV 91 02/05/2023    MCV 91 02/05/2023     (L) 02/05/2023     (L) 02/05/2023     BMP  Lab Results   Component Value Date     02/05/2023    K 3.8 02/05/2023     (H) 02/05/2023    CO2 23 02/05/2023    BUN 26 (H) 02/05/2023    CREATININE 1.0 02/05/2023    CALCIUM 7.6 (L) 02/05/2023    ANIONGAP 6 (L) 02/05/2023     02/05/2023    GLU  114 (H) 02/04/2023     (H) 02/04/2023       No results found for this or any previous visit.          Physical Exam  General: Well nourished and Alert    Airway:  Mallampati: II   Mouth Opening: Normal  TM Distance: Normal  Tongue: Normal  Neck ROM: Normal ROM    Dental:  Intact    Chest/Lungs:  Clear to auscultation, Normal Respiratory Rate    Heart:  Rate: Normal  Rhythm: Regular Rhythm  Sounds: Normal        Anesthesia Plan  Type of Anesthesia, risks & benefits discussed:    Anesthesia Type: MAC  Intra-op Monitoring Plan: Standard ASA Monitors  Induction:  IV  Informed Consent: Patient consented to blood products? Yes  ASA Score: 2 Emergent    Ready For Surgery From Anesthesia Perspective.     .

## 2023-02-05 NOTE — SUBJECTIVE & OBJECTIVE
No past medical history on file.    Past Surgical History:   Procedure Laterality Date    ORTHOPEDIC SURGERY         Review of patient's allergies indicates:  No Known Allergies    Current Facility-Administered Medications on File Prior to Encounter   Medication    0.9%  NaCl infusion (for blood administration)    [COMPLETED] 0.9%  NaCl infusion    [COMPLETED] acetaminophen tablet 325 mg    acetaminophen tablet 650 mg    [COMPLETED] aluminum-magnesium hydroxide-simethicone 200-200-20 mg/5 mL suspension 5 mL    [COMPLETED] butalbital-acetaminophen-caffeine -40 mg per tablet 1 tablet    [COMPLETED] ketorolac injection 30 mg    [COMPLETED] LORazepam injection 2 mg    [COMPLETED] metoclopramide HCl injection 10 mg    [COMPLETED] pantoprazole EC tablet 40 mg    [] pantoprazole injection 40 mg    [DISCONTINUED] 0.9%  NaCl infusion    [DISCONTINUED] pantoprazole (PROTONIX) 40 mg in sodium chloride 0.9 % 100 mL IVPB (MB+)     Current Outpatient Medications on File Prior to Encounter   Medication Sig    ondansetron (ZOFRAN-ODT) 4 MG TbDL Take 1 tablet (4 mg total) by mouth every 8 (eight) hours as needed (nausea/vomiting).     Family History       Problem Relation (Age of Onset)    Diabetes Mother, Father    Hypertension Mother, Father          Tobacco Use    Smoking status: Never    Smokeless tobacco: Never   Substance and Sexual Activity    Alcohol use: Yes     Comment: occasionally    Drug use: Never    Sexual activity: Yes     Partners: Female     Review of Systems   Constitutional:  Positive for fever.   HENT:  Negative for congestion.    Respiratory:  Negative for shortness of breath.    Cardiovascular:  Positive for palpitations. Negative for chest pain.   Gastrointestinal:  Positive for abdominal pain.   Genitourinary:  Negative for difficulty urinating.   Musculoskeletal:  Negative for neck stiffness.   Skin:  Negative for wound.   Neurological:  Negative for weakness.   Psychiatric/Behavioral:   Negative for confusion.    Objective:     Vital Signs (Most Recent):    Vital Signs (24h Range):  Temp:  [98.5 °F (36.9 °C)-101.3 °F (38.5 °C)] 99.8 °F (37.7 °C)  Pulse:  [] 96  Resp:  [16-23] 21  SpO2:  [98 %-100 %] 100 %  BP: (101-148)/(51-68) 111/57        There is no height or weight on file to calculate BMI.    Physical Exam  Vitals and nursing note reviewed.   Constitutional:       General: He is not in acute distress.     Appearance: He is not ill-appearing.   HENT:      Head: Normocephalic and atraumatic.      Mouth/Throat:      Mouth: Mucous membranes are moist.   Eyes:      General:         Right eye: No discharge.         Left eye: No discharge.      Extraocular Movements: Extraocular movements intact.      Conjunctiva/sclera: Conjunctivae normal.      Pupils: Pupils are equal, round, and reactive to light.   Cardiovascular:      Rate and Rhythm: Regular rhythm. Tachycardia present.      Comments: Warm peripheries, no LE edema  Pulmonary:      Effort: Pulmonary effort is normal. No respiratory distress.      Breath sounds: Normal breath sounds. No stridor. No wheezing or rhonchi.      Comments: On RA  Abdominal:      General: Bowel sounds are normal. There is no distension.      Palpations: Abdomen is soft.      Tenderness: There is abdominal tenderness (mild epigastric tenderness to deep palpation). There is no guarding.   Genitourinary:     Comments: No tong  Skin:     General: Skin is warm.   Neurological:      General: No focal deficit present.      Mental Status: He is alert and oriented to person, place, and time. Mental status is at baseline.   Psychiatric:         Mood and Affect: Mood normal.         CRANIAL NERVES     CN III, IV, VI   Pupils are equal, round, and reactive to light.     Significant Labs: BMP:   Recent Labs   Lab 02/05/23  0752         K 3.8   *   CO2 23   BUN 26*   CREATININE 1.0   CALCIUM 7.6*     CBC:   Recent Labs   Lab 02/04/23 2120 02/05/23  0752    WBC 12.73* 13.05*  13.05*   HGB 4.9* 5.4*  5.4*   HCT 15.8* 16.9*  16.9*    135*  135*     CMP:   Recent Labs   Lab 02/04/23  2120 02/04/23  2305 02/05/23  0752    141 141   K 3.6 4.0 3.8    112* 112*   CO2 23 23 23   * 114* 103   BUN 22* 23* 26*   CREATININE 0.9 0.9 1.0   CALCIUM 7.9* 7.3* 7.6*   PROT 5.0* 4.2* 4.3*   ALBUMIN 2.9* 2.4* 2.5*   BILITOT 0.2 0.1 0.2   ALKPHOS 51* 45* 35*   AST 17 16 15   ALT 26 20 22   ANIONGAP 7* 6* 6*     Cardiac Markers: No results for input(s): CKMB, MYOGLOBIN, BNP, TROPISTAT in the last 48 hours.  Coagulation: No results for input(s): PT, INR, APTT in the last 48 hours.  Lactic Acid:   Recent Labs   Lab 02/05/23  0850   LACTATE 1.2     Magnesium: No results for input(s): MG in the last 48 hours.  POCT Glucose: No results for input(s): POCTGLUCOSE in the last 48 hours.  Troponin: No results for input(s): TROPONINI, TROPONINIHS in the last 48 hours.  TSH: No results for input(s): TSH in the last 4320 hours.  Urine Culture: No results for input(s): LABURIN in the last 48 hours.  Urine Studies: No results for input(s): COLORU, APPEARANCEUA, PHUR, SPECGRAV, PROTEINUA, GLUCUA, KETONESU, BILIRUBINUA, OCCULTUA, NITRITE, UROBILINOGEN, LEUKOCYTESUR, RBCUA, WBCUA, BACTERIA, SQUAMEPITHEL, HYALINECASTS in the last 48 hours.    Invalid input(s): WRIGHTSUR    Significant Imaging: I have reviewed all pertinent imaging results/findings within the past 24 hours.    CT Head Without Contrast    Result Date: 2/5/2023  EXAMINATION: CT HEAD WITHOUT CONTRAST CLINICAL HISTORY: Seizure, new-onset, no history of trauma; TECHNIQUE: Low dose axial images were obtained through the head.  Coronal and sagittal reformations were also performed. Contrast was not administered. COMPARISON: None. FINDINGS: Ventricles and sulci are normal in size for age without evidence of hydrocephalus. The brain parenchyma appears within normal limits.  No definite parenchymal mass, hemorrhage, edema  or acute major vascular distribution infarct. No extra-axial blood or fluid collections. No displaced calvarial fracture. The mastoid air cells and visualized paranasal sinuses are essentially clear.     1. No evidence of an acute intracranial abnormality. The preliminary and final reports are concordant. Electronically signed by: Giuseppe Potter Date:    02/05/2023 Time:    07:13    X-Ray Chest AP Portable    Result Date: 2/5/2023  EXAMINATION: XR CHEST AP PORTABLE CLINICAL HISTORY: pain; Unspecified transfusion reaction, initial encounter TECHNIQUE: Single frontal view of the chest was performed. COMPARISON: None FINDINGS: Cardiac and mediastinal silhouettes are within normal limits.    Lungs are clear bilaterally.     No acute findings. Electronically signed by: Jasvir Rome MD Date:    02/05/2023 Time:    08:10

## 2023-02-05 NOTE — HPI
Galdino Burrell is a 27-year-old male who presents emergency room complaining of generalized weakness and fatigue.  Of note patient was recently diagnosed with influenza and started on Tamiflu which she completed.  He reports profound weakness and fatigue onset approximately 2 weeks ago and progressively worsened.  He also endorses black tarry stools.  He reports in periumbilical pain described as a cramping sensation.  That pain is alleviated with the eating.  He denies any alcohol use.  He denies any acetaminophen or ibuprofen use.  He is a nonsmoker.  ER workup:  CBC with anemia of 5 and 15.  Leukocytosis of 12,000.  CMP with BUN of 23 creatinine is 0.9 and albumin of 2.9 ER physician reported gross blood on rectal exam.  Patient admitted to Hospital Medicine for treatment management.  Will start patient on GI bleed pathway.  Start patient on Protonix infusion as well as transfused 3 units of packed red cells.  GI consult in a.m.

## 2023-02-05 NOTE — ED NOTES
Pt requested to get up to use the restroom. Assisted the Pt to sit up. Pt stated they felt dizzy. Observed Pt appearing to become less lucid. Pt shaking. Assisted Pt to lay back down. Pt became more oriented. Temp rechecked now at 101.3 oral. Pt stated they needed to use the restroom. Assited the Pt back up. Pt began urinating partially into the urinal and severely shaking. Pt disoriented. Assisted the Pt to their L side. Pt appears to be seizing. Called for assistance. Hospitalist gave order for 2mg IV ativan. RN brought to the bedside. Vital sign equipment reattached and 2L oxygen placed NC.   Will move Pt to a room closer to the nurses station.

## 2023-02-05 NOTE — ANESTHESIA POSTPROCEDURE EVALUATION
Anesthesia Post Evaluation    Patient: Galdino Burrell    Procedure(s) Performed: Procedure(s) (LRB):  EGD (ESOPHAGOGASTRODUODENOSCOPY) (N/A)    Final Anesthesia Type: MAC      Patient location during evaluation: ICU  Patient participation: Yes- Able to Participate  Level of consciousness: awake and alert and oriented  Post-procedure vital signs: reviewed and stable  Pain management: adequate  Airway patency: patent    PONV status at discharge: No PONV  Anesthetic complications: no      Cardiovascular status: blood pressure returned to baseline, hemodynamically stable and stable  Respiratory status: unassisted, spontaneous ventilation and nasal cannula  Hydration status: euvolemic  Follow-up not needed.          Vitals Value Taken Time   /60 02/05/23 1500   Temp 98.1 F 02/05/23 1508   Pulse 83 02/05/23 1506   Resp 19 02/05/23 1506   SpO2 100 % 02/05/23 1506   Vitals shown include unvalidated device data.      No case tracking events are documented in the log.      Pain/Frankie Score: Pain Rating Prior to Med Admin: 0 (2/5/2023  7:26 AM)  Pain Rating Post Med Admin: 1 (2/4/2023 10:11 PM)

## 2023-02-05 NOTE — SIGNIFICANT EVENT
Notified by nursing that patient temperature increased to 101.1. Patient seen at bedside and started to have a seizure. No history of seizure or blood transfusion reported by patient's wife.     Plan  -stopped transfusion  -Head CT pending

## 2023-02-05 NOTE — PLAN OF CARE
Problem: Adjustment to Illness (Gastrointestinal Bleeding)  Goal: Optimal Coping with Acute Illness  Outcome: Ongoing, Progressing     Problem: Bleeding (Gastrointestinal Bleeding)  Goal: Hemostasis  Outcome: Ongoing, Progressing     Problem: Adult Inpatient Plan of Care  Goal: Optimal Comfort and Wellbeing  Outcome: Ongoing, Progressing   Pt arrived to room 223. NO signs of distress noted. Continuous monitoring in place. RBC Infusing. Pt remains drowsy but responds to voice. Protonix infusing 20ml/hr. Seizure Precautions in place. Bed alarm set, call light in reach.

## 2023-02-05 NOTE — EICU
Intervention Initiated From:  [facility] / EICU    Marycruz intervened regarding:  Rounding (Video assessment)    Comments: video rounds completed.  Patient asleep.  VS:  98, 127/59, 20, 100% room air.  Ivf @ 75 cc/hr, protonix @ 20 cc/hr per alaris pump

## 2023-02-05 NOTE — CONSULTS
GASTROENTEROLOGY INPATIENT CONSULT NOTE  Patient Name: Galdino Burrell  Patient MRN: [id number]  Patient : 1995    Admit Date: 2023  Service date: 2023    Reason for Consult: melena, significant acute anemia    PCP: Primary Doctor No    CC: black, tarry BM    HPI: Patient is a 27 y.o. male with no known PMH who presents to [facility] initially for worsening fatigue and found to be profoundly anemic. Transferred to [facility] for ICU care after transfusion reaction while getting blood. Has noticed melenic BMs for the past 2-3 days. Denies hematemesis or hematochezia. No previous ulcers he is aware of. No previous endoscopy. No known liver disease. Denies NSAIDs.    Past Medical History:  History reviewed. No pertinent past medical history.     Past Surgical History:  Past Surgical History:   Procedure Laterality Date    ORTHOPEDIC SURGERY          Home Medications:  Medications Prior to Admission   Medication Sig Dispense Refill Last Dose    ondansetron (ZOFRAN-ODT) 4 MG TbDL Take 1 tablet (4 mg total) by mouth every 8 (eight) hours as needed (nausea/vomiting). 12 tablet 0        Inpatient Medications:   metoclopramide HCl  10 mg Intravenous Once     sodium chloride, acetaminophen, acetaminophen, dextrose 10 % in water (D10W), dextrose 50%, diphenhydrAMINE, glucagon (human recombinant), glucose, glucose, magnesium oxide, magnesium oxide, naloxone, ondansetron, potassium bicarbonate, potassium bicarbonate, potassium bicarbonate, potassium, sodium phosphates, potassium, sodium phosphates, potassium, sodium phosphates, senna-docusate 8.6-50 mg, sodium chloride 0.9%    Review of patient's allergies indicates:  No Known Allergies    Social History:   Social History     Occupational History    Not on file   Tobacco Use    Smoking status: Never    Smokeless tobacco: Never   Substance and Sexual Activity    Alcohol use: Yes     Comment: occasionally    Drug use: Never    Sexual activity: Yes      Partners: Female       Family History:   Family History   Problem Relation Age of Onset    Diabetes Mother     Hypertension Mother     Hypertension Father     Diabetes Father        Review of Systems:  Review of Systems   Constitutional:  Positive for malaise/fatigue. Negative for chills and fever.   HENT:  Negative for nosebleeds and sore throat.    Respiratory:  Negative for shortness of breath and wheezing.    Cardiovascular:  Negative for chest pain and leg swelling.   Gastrointestinal:  Positive for melena. Negative for abdominal pain, blood in stool, nausea and vomiting.   Genitourinary:  Negative for dysuria and hematuria.   Musculoskeletal:  Negative for falls and myalgias.   Skin:  Negative for itching and rash.   Neurological:  Positive for seizures and weakness. Negative for focal weakness and loss of consciousness.     OBJECTIVE:    Physical Exam:  24 Hour Vital Sign Ranges: Temp:  [98.5 °F (36.9 °C)-101.3 °F (38.5 °C)] 100.9 °F (38.3 °C)  Pulse:  [] 90  Resp:  [16-23] 21  SpO2:  [98 %-100 %] 100 %  BP: (101-148)/(51-72) 126/71  Most recent vitals: /71   Pulse 90   Temp (!) 100.9 °F (38.3 °C) (Oral)   Resp (!) 21   SpO2 100%    CONSTITUTIONAL: laying in bed, NAD  Eyes: PERRL, anicteric conjunctivae  ENT: nares patent, oral mucosa pink and moist without lesion  NECK: trachea midline; Good ROM  CV: regular rate and rhythm, no murmurs or gallops  RESP: clear to auscultation bilaterally, no wheezes, rhonci or rales  ABD: soft, non-tender, non-distended, normal bowel sounds  MSK: no swelling or edema, 2+ pulses distally  INTEGUMENT: warm/dry, no rashes or jaundice on limited skin exam  NEURO: appropriately conversant, no asterixis  PSYCH: normal affect    Labs:   I have personally reviewed the pertinent/available labs in [facility].     Recent Labs     02/04/23 2120 02/05/23  0752   WBC 12.73* 13.05*  13.05*   MCV 92 91  91    135*  135*     Recent Labs     02/04/23 2120 02/04/23  2305  02/05/23  0752    141 141   K 3.6 4.0 3.8    112* 112*   CO2 23 23 23   BUN 22* 23* 26*   * 114* 103     No results for input(s): ALB in the last 72 hours.    Invalid input(s): ALKP, SGOT, SGPT, TBIL, DBIL, TPRO  No results for input(s): PT, INR, PTT in the last 72 hours.      Radiology Review:  I have personally reviewed the recent available imaging in Epic.    No orders to display       Endoscopy:  None    IMPRESSION / RECOMMENDATIONS:  Acute Blood Loss Anemia  Melena  - Hb 13.4 on 1/22/23 and 4.9 on presentation  - hemodynamically stable. Transferred to [facility] for critical care services  - 2U pRBC being prepared  - plan for emergent EGD due to significance of blood loss from unknown source  - IV PPI gtt  - Transfuse Hb >7  - no known personal history of cirrhosis/liver disease but thrombocytopenia/low albumin?. INR ordered.  - I have discussed the risks, benefits, and alternatives of the procedure in detail with the patient. The risks include pain, discomfort, bleeding, infection, perforation, missed lesions or missed cancer, sedation/anesthesia risks, and even death. The benefit of the procedure is that it allows an evaluation of the reported problem or issue and possible intervention. The alternatives include not having the procedure or an alternative evaluation by another method.  The patient was given the opportunity to ask questions and they were answered. Informed consent was obtained.      Thank you for this consult.    Carlos Echeverria  2/5/2023  12:20 PM

## 2023-02-05 NOTE — H&P
[facility] Emergency [facility] Medicine  History & Physical    Patient Name: Galdino Burrell  MRN: [id number]  Patient Class: IP- Inpatient  Admission Date: 2/4/2023  Attending Physician: Rivera Summers MD   Primary Care Provider: Primary Doctor No         Patient information was obtained from patient, spouse/SO, past medical records and ER records.     Subjective:     Principal Problem:Acute blood loss anemia    Chief Complaint:   Chief Complaint   Patient presents with    Vomiting     For the past couple weeks post flu. Feels dehydrated. Concerns regarding possible STD         HPI: Galdino Burrell is a 27-year-old male who presents emergency room complaining of generalized weakness and fatigue.  Of note patient was recently diagnosed with influenza and started on Tamiflu which she completed.  He reports profound weakness and fatigue onset approximately 2 weeks ago and progressively worsened.  He also endorses black tarry stools.  He reports in periumbilical pain described as a cramping sensation.  That pain is alleviated with the eating.  He denies any alcohol use.  He denies any acetaminophen or ibuprofen use.  He is a nonsmoker.  ER workup:  CBC with anemia of 5 and 15.  Leukocytosis of 12,000.  CMP with BUN of 23 creatinine is 0.9 and albumin of 2.9 ER physician reported gross blood on rectal exam.  Patient admitted to Hospital Medicine for treatment management.  Will start patient on GI bleed pathway.  Start patient on Protonix infusion as well as transfused 3 units of packed red cells.  GI consult in a.m.      History reviewed. No pertinent past medical history.    History reviewed. No pertinent surgical history.    Review of patient's allergies indicates:  No Known Allergies    No current facility-administered medications on file prior to encounter.     Current Outpatient Medications on File Prior to Encounter   Medication Sig    ondansetron (ZOFRAN-ODT) 4 MG TbDL Take 1 tablet (4 mg total) by mouth  every 8 (eight) hours as needed (nausea/vomiting).     Family History       Family history is unknown by patient.          Tobacco Use    Smoking status: Never    Smokeless tobacco: Never   Substance and Sexual Activity    Alcohol use: Not Currently    Drug use: Never    Sexual activity: Yes     Partners: Female     Review of Systems   Constitutional:  Positive for activity change and appetite change. Negative for chills, diaphoresis and fever.   HENT:  Negative for congestion, nosebleeds and tinnitus.    Eyes:  Negative for photophobia and visual disturbance.   Respiratory:  Negative for cough, chest tightness, shortness of breath and wheezing.    Cardiovascular:  Negative for chest pain, palpitations and leg swelling.   Gastrointestinal:  Positive for abdominal pain, anal bleeding and nausea. Negative for abdominal distention, constipation, diarrhea and vomiting.   Endocrine: Negative for cold intolerance and heat intolerance.   Genitourinary:  Negative for difficulty urinating, dysuria, frequency, hematuria and urgency.   Musculoskeletal:  Negative for arthralgias, back pain and myalgias.   Skin:  Positive for color change. Negative for pallor, rash and wound.   Allergic/Immunologic: Negative for immunocompromised state.   Neurological:  Positive for weakness. Negative for dizziness, tremors, facial asymmetry and speech difficulty.   Hematological:  Negative for adenopathy. Does not bruise/bleed easily.   Psychiatric/Behavioral:  Negative for confusion and sleep disturbance. The patient is not nervous/anxious.    Objective:     Vital Signs (Most Recent):  Temp: 100.1 °F (37.8 °C) (02/04/23 2358)  Pulse: 110 (02/05/23 0012)  Resp: 18 (02/04/23 2349)  BP: (!) 106/55 (02/05/23 0012)  SpO2: 100 % (02/05/23 0012)   Vital Signs (24h Range):  Temp:  [98.5 °F (36.9 °C)-100.1 °F (37.8 °C)] 100.1 °F (37.8 °C)  Pulse:  [] 110  Resp:  [18-20] 18  SpO2:  [98 %-100 %] 100 %  BP: (104-148)/(53-68) 106/55     Weight:  98.9 kg (218 lb)  Body mass index is 29.57 kg/m².    Physical Exam  Vitals and nursing note reviewed.   Constitutional:       General: He is not in acute distress.     Appearance: He is well-developed. He is ill-appearing. He is not diaphoretic.   HENT:      Head: Normocephalic.      Mouth/Throat:      Mouth: Mucous membranes are dry.   Eyes:      General: No scleral icterus.     Pupils: Pupils are equal, round, and reactive to light.      Comments: Conjunctival pallor   Neck:      Vascular: No JVD.   Cardiovascular:      Rate and Rhythm: Regular rhythm. Tachycardia present.      Heart sounds: Normal heart sounds. No murmur heard.    No friction rub. No gallop.   Pulmonary:      Effort: Pulmonary effort is normal. No respiratory distress.      Breath sounds: Normal breath sounds. No wheezing or rales.   Abdominal:      General: Bowel sounds are normal. There is no distension.      Palpations: Abdomen is soft.      Tenderness: There is no abdominal tenderness. There is no guarding or rebound.   Musculoskeletal:         General: No tenderness. Normal range of motion.      Cervical back: Normal range of motion and neck supple.   Lymphadenopathy:      Cervical: No cervical adenopathy.   Skin:     General: Skin is warm and dry.      Capillary Refill: Capillary refill takes 2 to 3 seconds.      Coloration: Skin is pale.      Findings: No erythema or rash.   Neurological:      Mental Status: He is alert and oriented to person, place, and time.      Cranial Nerves: No cranial nerve deficit.      Sensory: No sensory deficit.      Coordination: Coordination normal.      Deep Tendon Reflexes: Reflexes normal.   Psychiatric:         Mood and Affect: Mood normal.         Behavior: Behavior normal.         Thought Content: Thought content normal.         Judgment: Judgment normal.         CRANIAL NERVES     CN III, IV, VI   Pupils are equal, round, and reactive to light.     Significant Labs: All pertinent labs within the past 24  hours have been reviewed.  CBC:   Recent Labs   Lab 02/04/23 2120   WBC 12.73*   HGB 4.9*   HCT 15.8*        CMP:   Recent Labs   Lab 02/04/23 2120 02/04/23  2305    141   K 3.6 4.0    112*   CO2 23 23   * 114*   BUN 22* 23*   CREATININE 0.9 0.9   CALCIUM 7.9* 7.3*   PROT 5.0* 4.2*   ALBUMIN 2.9* 2.4*   BILITOT 0.2 0.1   ALKPHOS 51* 45*   AST 17 16   ALT 26 20   ANIONGAP 7* 6*       Significant Imaging: I have reviewed all pertinent imaging results/findings within the past 24 hours.    Assessment/Plan:     * Acute blood loss anemia  Acute problem   GI bleed pathway   Transfusing 3 units of packed red blood cells   Protonix infusion   GI consult in a.m.  Trend CBC        VTE Risk Mitigation (From admission, onward)         Ordered     Place CHAVA hose  Until discontinued         02/04/23 2250     Place sequential compression device  Until discontinued         02/04/23 2250     IP VTE LOW RISK PATIENT  Once         02/04/23 2250                   Joao Telles NP  Department of Hospital Medicine   [facility] - Emergency Dept

## 2023-02-05 NOTE — H&P
[facility] Medicine  History & Physical    Patient Name: Galdino Burrell  MRN: [id number]  Patient Class: IP- Inpatient  Admission Date: 2/5/2023  Attending Physician: Elidia Thao MD   Primary Care Provider: Primary Doctor No         Patient information was obtained from patient, past medical records and ER records.     Subjective:     Principal Problem:Symptomatic anemia    Chief Complaint: No chief complaint on file.       HPI: Mr. Burrell is a 27-year-old male who presented as a direct admission from [facility] for ICU level of care.  Patient reports about 2 weeks ago he was diagnosed with influenza, he was treated with anti nausea and Tamiflu.  He reports 2 week history of progressively worsening generalized weakness with fatigue.  Reports over the last week he was experiencing migraine type headache, nausea with episodes of emesis, did not visualize any bright red blood in emesis, as well as black stool with small amount of bright red blood.  Had mild associated epigastric abdominal discomfort, he felt this was due to reduced oral intake.  Denies any NSAID use.  He has never required blood transfusions in the past.  No past medical history, remote orthopedic procedure, only occasional alcohol use, nonsmoker, no recreational drugs.  At outside hospital on presentation H&H 5/1.5, previous labs 1/22/23 H&H 13.4/42.5.  He was admitted at outside hospital with IV PPI infusion and plans for 3 units PRBC transfusion with GI consultation.  On 1st unit transfusion patient had fever 101.3, with associated seizure activity, transfusion was stopped, transfusion workup was submitted, he responded to Versed, CT head no acute.  This unit of blood was discarded and patient was subsequently transfused 2nd unit PRBC.  He was seen by physicians this morning and concern for ICU level of care and therefore he was transferred to [facility].  He denies fever prior to hospital presentation, states he was experiencing  intermittent palpitation, states he remembers feeling warm, lightheaded/dizzy but does not fully recollect further events of last night.  He is currently alert and oriented, no focal deficits.      No past medical history on file.    Past Surgical History:   Procedure Laterality Date    ORTHOPEDIC SURGERY         Review of patient's allergies indicates:  No Known Allergies    Current Facility-Administered Medications on File Prior to Encounter   Medication    0.9%  NaCl infusion (for blood administration)    [COMPLETED] 0.9%  NaCl infusion    [COMPLETED] acetaminophen tablet 325 mg    acetaminophen tablet 650 mg    [COMPLETED] aluminum-magnesium hydroxide-simethicone 200-200-20 mg/5 mL suspension 5 mL    [COMPLETED] butalbital-acetaminophen-caffeine -40 mg per tablet 1 tablet    [COMPLETED] ketorolac injection 30 mg    [COMPLETED] LORazepam injection 2 mg    [COMPLETED] metoclopramide HCl injection 10 mg    [COMPLETED] pantoprazole EC tablet 40 mg    [] pantoprazole injection 40 mg    [DISCONTINUED] 0.9%  NaCl infusion    [DISCONTINUED] pantoprazole (PROTONIX) 40 mg in sodium chloride 0.9 % 100 mL IVPB (MB+)     Current Outpatient Medications on File Prior to Encounter   Medication Sig    ondansetron (ZOFRAN-ODT) 4 MG TbDL Take 1 tablet (4 mg total) by mouth every 8 (eight) hours as needed (nausea/vomiting).     Family History       Problem Relation (Age of Onset)    Diabetes Mother, Father    Hypertension Mother, Father          Tobacco Use    Smoking status: Never    Smokeless tobacco: Never   Substance and Sexual Activity    Alcohol use: Yes     Comment: occasionally    Drug use: Never    Sexual activity: Yes     Partners: Female     Review of Systems   Constitutional:  Positive for fever.   HENT:  Negative for congestion.    Respiratory:  Negative for shortness of breath.    Cardiovascular:  Positive for palpitations. Negative for chest pain.   Gastrointestinal:  Positive for  abdominal pain.   Genitourinary:  Negative for difficulty urinating.   Musculoskeletal:  Negative for neck stiffness.   Skin:  Negative for wound.   Neurological:  Negative for weakness.   Psychiatric/Behavioral:  Negative for confusion.    Objective:     Vital Signs (Most Recent):    Vital Signs (24h Range):  Temp:  [98.5 °F (36.9 °C)-101.3 °F (38.5 °C)] 99.8 °F (37.7 °C)  Pulse:  [] 96  Resp:  [16-23] 21  SpO2:  [98 %-100 %] 100 %  BP: (101-148)/(51-68) 111/57        There is no height or weight on file to calculate BMI.    Physical Exam  Vitals and nursing note reviewed.   Constitutional:       General: He is not in acute distress.     Appearance: He is not ill-appearing.   HENT:      Head: Normocephalic and atraumatic.      Mouth/Throat:      Mouth: Mucous membranes are moist.   Eyes:      General:         Right eye: No discharge.         Left eye: No discharge.      Extraocular Movements: Extraocular movements intact.      Conjunctiva/sclera: Conjunctivae normal.      Pupils: Pupils are equal, round, and reactive to light.   Cardiovascular:      Rate and Rhythm: Regular rhythm. Tachycardia present.      Comments: Warm peripheries, no LE edema  Pulmonary:      Effort: Pulmonary effort is normal. No respiratory distress.      Breath sounds: Normal breath sounds. No stridor. No wheezing or rhonchi.      Comments: On RA  Abdominal:      General: Bowel sounds are normal. There is no distension.      Palpations: Abdomen is soft.      Tenderness: There is abdominal tenderness (mild epigastric tenderness to deep palpation). There is no guarding.   Genitourinary:     Comments: No tong  Skin:     General: Skin is warm.   Neurological:      General: No focal deficit present.      Mental Status: He is alert and oriented to person, place, and time. Mental status is at baseline.   Psychiatric:         Mood and Affect: Mood normal.         CRANIAL NERVES     CN III, IV, VI   Pupils are equal, round, and reactive to  light.     Significant Labs: BMP:   Recent Labs   Lab 02/05/23  0752         K 3.8   *   CO2 23   BUN 26*   CREATININE 1.0   CALCIUM 7.6*     CBC:   Recent Labs   Lab 02/04/23 2120 02/05/23  0752   WBC 12.73* 13.05*  13.05*   HGB 4.9* 5.4*  5.4*   HCT 15.8* 16.9*  16.9*    135*  135*     CMP:   Recent Labs   Lab 02/04/23 2120 02/04/23  2305 02/05/23  0752    141 141   K 3.6 4.0 3.8    112* 112*   CO2 23 23 23   * 114* 103   BUN 22* 23* 26*   CREATININE 0.9 0.9 1.0   CALCIUM 7.9* 7.3* 7.6*   PROT 5.0* 4.2* 4.3*   ALBUMIN 2.9* 2.4* 2.5*   BILITOT 0.2 0.1 0.2   ALKPHOS 51* 45* 35*   AST 17 16 15   ALT 26 20 22   ANIONGAP 7* 6* 6*     Cardiac Markers: No results for input(s): CKMB, MYOGLOBIN, BNP, TROPISTAT in the last 48 hours.  Coagulation: No results for input(s): PT, INR, APTT in the last 48 hours.  Lactic Acid:   Recent Labs   Lab 02/05/23  0850   LACTATE 1.2     Magnesium: No results for input(s): MG in the last 48 hours.  POCT Glucose: No results for input(s): POCTGLUCOSE in the last 48 hours.  Troponin: No results for input(s): TROPONINI, TROPONINIHS in the last 48 hours.  TSH: No results for input(s): TSH in the last 4320 hours.  Urine Culture: No results for input(s): LABURIN in the last 48 hours.  Urine Studies: No results for input(s): COLORU, APPEARANCEUA, PHUR, SPECGRAV, PROTEINUA, GLUCUA, KETONESU, BILIRUBINUA, OCCULTUA, NITRITE, UROBILINOGEN, LEUKOCYTESUR, RBCUA, WBCUA, BACTERIA, SQUAMEPITHEL, HYALINECASTS in the last 48 hours.    Invalid input(s): NGA    Significant Imaging: I have reviewed all pertinent imaging results/findings within the past 24 hours.    CT Head Without Contrast    Result Date: 2/5/2023  EXAMINATION: CT HEAD WITHOUT CONTRAST CLINICAL HISTORY: Seizure, new-onset, no history of trauma; TECHNIQUE: Low dose axial images were obtained through the head.  Coronal and sagittal reformations were also performed. Contrast was not  administered. COMPARISON: None. FINDINGS: Ventricles and sulci are normal in size for age without evidence of hydrocephalus. The brain parenchyma appears within normal limits.  No definite parenchymal mass, hemorrhage, edema or acute major vascular distribution infarct. No extra-axial blood or fluid collections. No displaced calvarial fracture. The mastoid air cells and visualized paranasal sinuses are essentially clear.     1. No evidence of an acute intracranial abnormality. The preliminary and final reports are concordant. Electronically signed by: Giuseppe Potter Date:    02/05/2023 Time:    07:13    X-Ray Chest AP Portable    Result Date: 2/5/2023  EXAMINATION: XR CHEST AP PORTABLE CLINICAL HISTORY: pain; Unspecified transfusion reaction, initial encounter TECHNIQUE: Single frontal view of the chest was performed. COMPARISON: None FINDINGS: Cardiac and mediastinal silhouettes are within normal limits.    Lungs are clear bilaterally.     No acute findings. Electronically signed by: Jasvir Rome MD Date:    02/05/2023 Time:    08:10       Assessment/Plan:     Active Hospital Problems    Diagnosis    *Symptomatic anemia    Acute blood loss anemia    Transfusion reaction    Concern for upper GI bleed    Fever    Thrombocytopenia     Plan:  Admit inpatient, ICU, continuous cardiac telemetry monitoring  NPO pending GI evaluation  Continue IV PPI continuous infusion   Repeat type and screen to be sent, will restart PRBC transfusion, ordered 2 unit, discussed with nursing will do at slowest rate of infusion, premedicate  UA with reflex urine culture, blood culture, chest x-ray performed and clear  Ensure patient has good peripheral access  H&H Q 8 hours for trending  Seizure precautions   Lactic acid and ammonia within normal, CT head no acute  Electrolytes sliding scale repletion  A.m. labs ordered   Gastroenterology consulted   Further plan as per hospital course    VTE Risk Mitigation (From admission, onward)          Ordered     IP VTE LOW RISK PATIENT  Once         02/05/23 1109     Place sequential compression device  Until discontinued         02/05/23 1109                   Elidia Thao MD  Department of Hospital Medicine   [facility]

## 2023-02-06 PROBLEM — K26.9 DUODENAL ULCER: Status: ACTIVE | Noted: 2023-02-06

## 2023-02-06 PROBLEM — K92.2 GI BLEED: Status: RESOLVED | Noted: 2023-02-05 | Resolved: 2023-02-06

## 2023-02-06 PROBLEM — D62 ACUTE BLOOD LOSS ANEMIA: Status: RESOLVED | Noted: 2023-02-05 | Resolved: 2023-02-06

## 2023-02-06 LAB
ANION GAP SERPL CALC-SCNC: 5 MMOL/L (ref 8–16)
BILIRUB UR QL STRIP: NEGATIVE
BLD PROD TYP BPU: NORMAL
BLOOD UNIT EXPIRATION DATE: NORMAL
BLOOD UNIT TYPE CODE: 5100
BLOOD UNIT TYPE: NORMAL
BUN SERPL-MCNC: 19 MG/DL (ref 6–20)
CALCIUM SERPL-MCNC: 7.7 MG/DL (ref 8.7–10.5)
CHLORIDE SERPL-SCNC: 111 MMOL/L (ref 95–110)
CLARITY UR: CLEAR
CO2 SERPL-SCNC: 25 MMOL/L (ref 23–29)
CODING SYSTEM: NORMAL
COLOR UR: YELLOW
CREAT SERPL-MCNC: 1 MG/DL (ref 0.5–1.4)
CROSSMATCH INTERPRETATION: NORMAL
DISPENSE STATUS: NORMAL
ERYTHROCYTE [DISTWIDTH] IN BLOOD BY AUTOMATED COUNT: 14.7 % (ref 11.5–14.5)
EST. GFR  (NO RACE VARIABLE): >60 ML/MIN/1.73 M^2
GLUCOSE SERPL-MCNC: 108 MG/DL (ref 70–110)
GLUCOSE UR QL STRIP: NEGATIVE
HCT VFR BLD AUTO: 19.9 % (ref 40–54)
HCT VFR BLD AUTO: 20.5 % (ref 40–54)
HCT VFR BLD AUTO: 22.1 % (ref 40–54)
HCT VFR BLD AUTO: 23.1 % (ref 40–54)
HGB BLD-MCNC: 6.6 G/DL (ref 14–18)
HGB BLD-MCNC: 6.8 G/DL (ref 14–18)
HGB BLD-MCNC: 7.2 G/DL (ref 14–18)
HGB BLD-MCNC: 7.5 G/DL (ref 14–18)
HGB UR QL STRIP: NEGATIVE
KETONES UR QL STRIP: ABNORMAL
LEUKOCYTE ESTERASE UR QL STRIP: NEGATIVE
MAGNESIUM SERPL-MCNC: 1.9 MG/DL (ref 1.6–2.6)
MCH RBC QN AUTO: 30 PG (ref 27–31)
MCHC RBC AUTO-ENTMCNC: 33.2 G/DL (ref 32–36)
MCV RBC AUTO: 90 FL (ref 82–98)
NITRITE UR QL STRIP: NEGATIVE
NUM UNITS TRANS PACKED RBC: NORMAL
PH UR STRIP: 7 [PH] (ref 5–8)
PHOSPHATE SERPL-MCNC: 2.9 MG/DL (ref 2.7–4.5)
PLATELET # BLD AUTO: 111 K/UL (ref 150–450)
PMV BLD AUTO: 10.8 FL (ref 9.2–12.9)
POTASSIUM SERPL-SCNC: 3.5 MMOL/L (ref 3.5–5.1)
PROT UR QL STRIP: ABNORMAL
RBC # BLD AUTO: 2.27 M/UL (ref 4.6–6.2)
SODIUM SERPL-SCNC: 141 MMOL/L (ref 136–145)
SP GR UR STRIP: >1.03 (ref 1–1.03)
URN SPEC COLLECT METH UR: ABNORMAL
UROBILINOGEN UR STRIP-ACNC: NEGATIVE EU/DL
WBC # BLD AUTO: 9.71 K/UL (ref 3.9–12.7)

## 2023-02-06 PROCEDURE — 84100 ASSAY OF PHOSPHORUS: CPT | Performed by: INTERNAL MEDICINE

## 2023-02-06 PROCEDURE — 63600175 PHARM REV CODE 636 W HCPCS: Performed by: INTERNAL MEDICINE

## 2023-02-06 PROCEDURE — 85014 HEMATOCRIT: CPT | Mod: 91 | Performed by: INTERNAL MEDICINE

## 2023-02-06 PROCEDURE — 25000003 PHARM REV CODE 250: Performed by: INTERNAL MEDICINE

## 2023-02-06 PROCEDURE — 85018 HEMOGLOBIN: CPT | Performed by: INTERNAL MEDICINE

## 2023-02-06 PROCEDURE — 36430 TRANSFUSION BLD/BLD COMPNT: CPT

## 2023-02-06 PROCEDURE — P9016 RBC LEUKOCYTES REDUCED: HCPCS | Performed by: INTERNAL MEDICINE

## 2023-02-06 PROCEDURE — C9113 INJ PANTOPRAZOLE SODIUM, VIA: HCPCS | Performed by: INTERNAL MEDICINE

## 2023-02-06 PROCEDURE — 83735 ASSAY OF MAGNESIUM: CPT | Performed by: INTERNAL MEDICINE

## 2023-02-06 PROCEDURE — 85014 HEMATOCRIT: CPT | Performed by: INTERNAL MEDICINE

## 2023-02-06 PROCEDURE — 80048 BASIC METABOLIC PNL TOTAL CA: CPT | Performed by: INTERNAL MEDICINE

## 2023-02-06 PROCEDURE — 25000003 PHARM REV CODE 250

## 2023-02-06 PROCEDURE — 12000002 HC ACUTE/MED SURGE SEMI-PRIVATE ROOM

## 2023-02-06 PROCEDURE — 36415 COLL VENOUS BLD VENIPUNCTURE: CPT | Performed by: INTERNAL MEDICINE

## 2023-02-06 PROCEDURE — 81003 URINALYSIS AUTO W/O SCOPE: CPT | Performed by: INTERNAL MEDICINE

## 2023-02-06 PROCEDURE — 85018 HEMOGLOBIN: CPT | Mod: 91 | Performed by: INTERNAL MEDICINE

## 2023-02-06 PROCEDURE — 85027 COMPLETE CBC AUTOMATED: CPT | Performed by: INTERNAL MEDICINE

## 2023-02-06 RX ADMIN — SODIUM CHLORIDE 8 MG/HR: 9 INJECTION, SOLUTION INTRAVENOUS at 03:02

## 2023-02-06 RX ADMIN — MUPIROCIN 1 G: 20 OINTMENT TOPICAL at 09:02

## 2023-02-06 RX ADMIN — SODIUM CHLORIDE 8 MG/HR: 9 INJECTION, SOLUTION INTRAVENOUS at 08:02

## 2023-02-06 RX ADMIN — CHLORHEXIDINE GLUCONATE 15 ML: 1.2 RINSE ORAL at 09:02

## 2023-02-06 RX ADMIN — MUPIROCIN 1 G: 20 OINTMENT TOPICAL at 08:02

## 2023-02-06 RX ADMIN — CHLORHEXIDINE GLUCONATE 15 ML: 1.2 RINSE ORAL at 08:02

## 2023-02-06 NOTE — SUBJECTIVE & OBJECTIVE
Interval History:  Patient states overall he feels better today.  He tolerated breakfast without any issues.  No nausea, vomiting, abdominal pain.  He has not had any bowel movement since admission.  No further seizure-like activity with PRBC transfusion.  He was febrile to 100.9.  Received 3 units PRBC transfusion at [facility].  Hemoglobin this morning 6.8 prior to 3rd unit of PRBC.  EGD with to duodenal ulcer.  Discussed with patient.  Discussed with nursing.    Review of Systems   Respiratory:  Negative for shortness of breath.    Cardiovascular:  Negative for chest pain.   Gastrointestinal:  Negative for nausea and vomiting.   Neurological:  Negative for seizures.   Psychiatric/Behavioral:  Negative for confusion.    Objective:     Vital Signs (Most Recent):  Temp: 98.3 °F (36.8 °C) (02/06/23 0701)  Pulse: 77 (02/06/23 0701)  Resp: 18 (02/06/23 0701)  BP: 120/67 (02/06/23 0701)  SpO2: 99 % (02/06/23 0701) Vital Signs (24h Range):  Temp:  [98.3 °F (36.8 °C)-100.9 °F (38.3 °C)] 98.3 °F (36.8 °C)  Pulse:  [] 77  Resp:  [14-25] 18  SpO2:  [97 %-100 %] 99 %  BP: ()/(48-76) 120/67     Weight: 102.7 kg (226 lb 6.6 oz)  Body mass index is 29.87 kg/m².    Intake/Output Summary (Last 24 hours) at 2/6/2023 0846  Last data filed at 2/6/2023 0741  Gross per 24 hour   Intake 1738.91 ml   Output 475 ml   Net 1263.91 ml      Physical Exam  Vitals and nursing note reviewed.   HENT:      Head: Normocephalic and atraumatic.      Mouth/Throat:      Mouth: Mucous membranes are moist.   Eyes:      General:         Right eye: No discharge.         Left eye: No discharge.   Cardiovascular:      Rate and Rhythm: Normal rate and regular rhythm.      Comments: Warm peripheries, no lower extremity edema  Pulmonary:      Effort: Pulmonary effort is normal. No respiratory distress.      Breath sounds: Normal breath sounds. No stridor. No wheezing.   Abdominal:      General: There is no distension.      Palpations: Abdomen is soft.       Tenderness: There is no abdominal tenderness. There is no guarding.   Skin:     General: Skin is warm.   Neurological:      General: No focal deficit present.      Mental Status: He is alert and oriented to person, place, and time. Mental status is at baseline.   Psychiatric:         Mood and Affect: Mood normal.       Significant Labs: BMP:   Recent Labs   Lab 02/06/23  0256         K 3.5   *   CO2 25   BUN 19   CREATININE 1.0   CALCIUM 7.7*   MG 1.9     CBC:   Recent Labs   Lab 02/04/23 2120 02/05/23  0752 02/05/23  1345 02/06/23  0115 02/06/23  0256 02/06/23  0805   WBC 12.73* 13.05*  13.05*  --   --  9.71  --    HGB 4.9* 5.4*  5.4*   < > 6.6* 6.8* 7.5*   HCT 15.8* 16.9*  16.9*   < > 19.9* 20.5* 23.1*    135*  135*  --   --  111*  --     < > = values in this interval not displayed.     CMP:   Recent Labs   Lab 02/04/23 2120 02/04/23  2305 02/05/23  0752 02/06/23  0256    141 141 141   K 3.6 4.0 3.8 3.5    112* 112* 111*   CO2 23 23 23 25   * 114* 103 108   BUN 22* 23* 26* 19   CREATININE 0.9 0.9 1.0 1.0   CALCIUM 7.9* 7.3* 7.6* 7.7*   PROT 5.0* 4.2* 4.3*  --    ALBUMIN 2.9* 2.4* 2.5*  --    BILITOT 0.2 0.1 0.2  --    ALKPHOS 51* 45* 35*  --    AST 17 16 15  --    ALT 26 20 22  --    ANIONGAP 7* 6* 6* 5*     Magnesium:   Recent Labs   Lab 02/06/23  0256   MG 1.9       Significant Imaging: I have reviewed all pertinent imaging results/findings within the past 24 hours.

## 2023-02-06 NOTE — PLAN OF CARE
02/06/23 0729   Final Note   Assessment Type Final Discharge Note   Anticipated Discharge Disposition Short Term

## 2023-02-06 NOTE — NURSING
Pt received per w/c with wife at his side. To rm 3, AAOx3. Denies any problems, pain or needs at this moment. Protonix 20ml/hr infusing to left PIV site intact and healthy. Right AC #20g PIV intact saline locked healthy. Pt oriented to rm and plan of care. Denies nausea, pain. SR up x 2 and call bell in reach.

## 2023-02-06 NOTE — PROGRESS NOTES
[facility] Medicine  Progress Note    Patient Name: Galdino Burrell  MRN: [id number]  Patient Class: IP- Inpatient   Admission Date: 2/5/2023  Length of Stay: 1 days  Attending Physician: Elidia Thao MD  Primary Care Provider: Primary Doctor No        Subjective:     Principal Problem:Symptomatic anemia        HPI:  Mr. Burrell is a 27-year-old male who presented as a direct admission from [facility] for ICU level of care.  Patient reports about 2 weeks ago he was diagnosed with influenza, he was treated with anti nausea and Tamiflu.  He reports 2 week history of progressively worsening generalized weakness with fatigue.  Reports over the last week he was experiencing migraine type headache, nausea with episodes of emesis, did not visualize any bright red blood in emesis, as well as black stool with small amount of bright red blood.  Had mild associated epigastric abdominal discomfort, he felt this was due to reduced oral intake.  Denies any NSAID use.  He has never required blood transfusions in the past.  No past medical history, remote orthopedic procedure, only occasional alcohol use, nonsmoker, no recreational drugs.  At outside hospital on presentation H&H 5/1.5, previous labs 1/22/23 H&H 13.4/42.5.  He was admitted at outside hospital with IV PPI infusion and plans for 3 units PRBC transfusion with GI consultation.  On 1st unit transfusion patient had fever 101.3, with associated seizure activity, transfusion was stopped, transfusion workup was submitted, he responded to Versed, CT head no acute.  This unit of blood was discarded and patient was subsequently transfused 2nd unit PRBC.  He was seen by physicians this morning and concern for ICU level of care and therefore he was transferred to [facility].  He denies fever prior to hospital presentation, states he was experiencing intermittent palpitation, states he remembers feeling warm, lightheaded/dizzy but does not fully recollect further  events of last night.  He is currently alert and oriented, no focal deficits.      Overview/Hospital Course:  Patient was admitted as a transfer from [facility] for higher level of care in the setting of transfusion reaction to PRBC.  Patient initially presented with symptomatic anemia, hemoglobin was found to be 5 from previous normal with concern for symptoms of upper GI bleed.  He had fever with seizure-like activity with PRBC transfusion.  He was admitted to ICU, repeat type and screen sent, 2 further units of PRBC transfused without any further reactions.  He underwent EGD which demonstrated to nonbleeding crater duodenal ulcer within duodenal bulb with visible blood vessel status post bipolar cautery.  On 02/06 hemoglobin still 6.8, transfusing 3rd unit of PRBC and monitoring.  Transferring to medical floor.  Currently on IV PPI infusion, gastroenterology recommends b.i.d. PPI for 8 weeks on discharge.      Interval History:  Patient states overall he feels better today.  He tolerated breakfast without any issues.  No nausea, vomiting, abdominal pain.  He has not had any bowel movement since admission.  No further seizure-like activity with PRBC transfusion.  He was febrile to 100.9.  Received 3 units PRBC transfusion at [facility].  Hemoglobin this morning 6.8 prior to 3rd unit of PRBC.  EGD with to duodenal ulcer.  Discussed with patient.  Discussed with nursing.    Review of Systems   Respiratory:  Negative for shortness of breath.    Cardiovascular:  Negative for chest pain.   Gastrointestinal:  Negative for nausea and vomiting.   Neurological:  Negative for seizures.   Psychiatric/Behavioral:  Negative for confusion.    Objective:     Vital Signs (Most Recent):  Temp: 98.3 °F (36.8 °C) (02/06/23 0701)  Pulse: 77 (02/06/23 0701)  Resp: 18 (02/06/23 0701)  BP: 120/67 (02/06/23 0701)  SpO2: 99 % (02/06/23 0701) Vital Signs (24h Range):  Temp:  [98.3 °F (36.8 °C)-100.9 °F (38.3 °C)] 98.3 °F (36.8 °C)  Pulse:  []  77  Resp:  [14-25] 18  SpO2:  [97 %-100 %] 99 %  BP: ()/(48-76) 120/67     Weight: 102.7 kg (226 lb 6.6 oz)  Body mass index is 29.87 kg/m².    Intake/Output Summary (Last 24 hours) at 2/6/2023 0846  Last data filed at 2/6/2023 0741  Gross per 24 hour   Intake 1738.91 ml   Output 475 ml   Net 1263.91 ml      Physical Exam  Vitals and nursing note reviewed.   HENT:      Head: Normocephalic and atraumatic.      Mouth/Throat:      Mouth: Mucous membranes are moist.   Eyes:      General:         Right eye: No discharge.         Left eye: No discharge.   Cardiovascular:      Rate and Rhythm: Normal rate and regular rhythm.      Comments: Warm peripheries, no lower extremity edema  Pulmonary:      Effort: Pulmonary effort is normal. No respiratory distress.      Breath sounds: Normal breath sounds. No stridor. No wheezing.   Abdominal:      General: There is no distension.      Palpations: Abdomen is soft.      Tenderness: There is no abdominal tenderness. There is no guarding.   Skin:     General: Skin is warm.   Neurological:      General: No focal deficit present.      Mental Status: He is alert and oriented to person, place, and time. Mental status is at baseline.   Psychiatric:         Mood and Affect: Mood normal.       Significant Labs: BMP:   Recent Labs   Lab 02/06/23 0256         K 3.5   *   CO2 25   BUN 19   CREATININE 1.0   CALCIUM 7.7*   MG 1.9     CBC:   Recent Labs   Lab 02/04/23 2120 02/05/23  0752 02/05/23  1345 02/06/23  0115 02/06/23  0256 02/06/23  0805   WBC 12.73* 13.05*  13.05*  --   --  9.71  --    HGB 4.9* 5.4*  5.4*   < > 6.6* 6.8* 7.5*   HCT 15.8* 16.9*  16.9*   < > 19.9* 20.5* 23.1*    135*  135*  --   --  111*  --     < > = values in this interval not displayed.     CMP:   Recent Labs   Lab 02/04/23 2120 02/04/23  2305 02/05/23  0752 02/06/23  0256    141 141 141   K 3.6 4.0 3.8 3.5    112* 112* 111*   CO2 23 23 23 25   * 114* 103 108    BUN 22* 23* 26* 19   CREATININE 0.9 0.9 1.0 1.0   CALCIUM 7.9* 7.3* 7.6* 7.7*   PROT 5.0* 4.2* 4.3*  --    ALBUMIN 2.9* 2.4* 2.5*  --    BILITOT 0.2 0.1 0.2  --    ALKPHOS 51* 45* 35*  --    AST 17 16 15  --    ALT 26 20 22  --    ANIONGAP 7* 6* 6* 5*     Magnesium:   Recent Labs   Lab 02/06/23  0256   MG 1.9       Significant Imaging: I have reviewed all pertinent imaging results/findings within the past 24 hours.      Assessment/Plan:      Active Hospital Problems    Diagnosis    *Symptomatic anemia    Duodenal ulcer    Transfusion reaction    Fever    Thrombocytopenia     Plan:  Transfer to medical floor  Status post 3 units PRBC transfusion at [facility], current hemoglobin 7.5, continue to trend  Status post EGD 2/5 with 2 nonbleeding crater duodenal ulcer within duodenal bulb with visible blood vessel status post bipolar cautery  Continue IV PPI, transition to b.i.d. for 8 weeks on discharge   Follow-up EGD biopsy   Tolerating regular diet without any issues   A.m. labs ordered  Appreciate Gastroenterology input  Further plan as per hospital course    VTE Risk Mitigation (From admission, onward)         Ordered     IP VTE LOW RISK PATIENT  Once         02/05/23 1109     Place sequential compression device  Until discontinued         02/05/23 1109                Discharge Planning   ROSA:      Code Status: Full Code   Is the patient medically ready for discharge?:     Reason for patient still in hospital (select all that apply): Patient trending condition                     Elidia Thao MD  Department of Hospital Medicine   [facility]

## 2023-02-06 NOTE — NURSING
Transferred from [facility] for GIB, s/p transfusion reaction and SZ.    AAOx4.Respirations unlabored. Abdomen soft. Denies nausea. Placed on monitor.  Dr Thao at bedside.

## 2023-02-06 NOTE — HOSPITAL COURSE
Patient was admitted as a transfer from [facility] for higher level of care in the setting of transfusion reaction to PRBC.  Patient initially presented with symptomatic anemia, hemoglobin was found to be 5 from previous normal with concern for symptoms of upper GI bleed.  He had fever with seizure-like activity with PRBC transfusion.  He was admitted to ICU, repeat type and screen sent, 2 further units of PRBC transfused without any further reactions.  He underwent EGD which demonstrated 2 nonbleeding crater duodenal ulcer within duodenal bulb with visible blood vessel status post bipolar cautery.  On 02/06 hemoglobin still 6.8, transfusing 3rd unit of PRBC and monitoring.  Transferring to medical floor.  Currently on IV PPI infusion, gastroenterology recommends b.i.d. PPI for 8 weeks on discharge.  On 02/07, vital stable, hemoglobin stable at 7.5 without any further signs or symptoms of ongoing GI bleed.  Feels well, cleared by consultant for discharge and appears medically stable for discharge with outpatient follow-up.  Counseled patient on diagnosis and treatment plan.  Educational material provided.  Discharge plan including medication, follow-up as well as return precautions were reviewed with the patient, he expressed understanding, no questions or concerns.      Discharge examination   Sitting in bed, alert, oriented, on room air, regular heart rhythm, abdomen nontender

## 2023-02-06 NOTE — NURSING
AM labs resulted in critically low H&H. Dr. Leyva  notified. 1 unit PRBC ordered and transfused. No complications or interactions noted during transfusion.

## 2023-02-06 NOTE — PLAN OF CARE
[facility]  Initial Discharge Assessment       Primary Care Provider: Primary Doctor No    Admission Diagnosis: Blood transfusion reaction [T80.92XA]    Admission Date: 2/5/2023  Expected Discharge Date: 2/7/2023         Payor: ALIYA / Plan:  [employer] / Product Type: Government /     Extended Emergency Contact Information  Primary Emergency Contact: Ana Laura Banegas  Mobile Phone: [phone]  Relation: Spouse  Preferred language: English   needed? No    Discharge Plan A: Home with family  Discharge Plan B: Home with family      [id number] - [place] - [address]  [address]  [place] [address]  Phone: [phone] Fax: [phone]      Initial Assessment (most recent)       Adult Discharge Assessment - 02/06/23 1444          Discharge Assessment    Assessment Type Discharge Planning Assessment     Confirmed/corrected address, phone number and insurance Yes     Confirmed Demographics Correct on Facesheet     Source of Information patient     Communicated ROSA with patient/caregiver Yes     Reason For Admission Symptomatic anemia     People in Home spouse     Facility Arrived From: home     Do you expect to return to your current living situation? Yes     Do you have help at home or someone to help you manage your care at home? Yes     Who are your caregiver(s) and their phone number(s)? Spouse     Prior to hospitilization cognitive status: Alert/Oriented     Current cognitive status: Alert/Oriented     Equipment Currently Used at Home none     Readmission within 30 days? Yes     Patient currently being followed by outpatient case management? No     Do you currently have service(s) that help you manage your care at home? No     Do you take prescription medications? Yes     Do you have prescription coverage? Yes     Do you have any problems affording any of your prescribed medications? No     Is the patient taking medications as prescribed? yes     Who is going  to help you get home at discharge? spouse     How do you get to doctors appointments? car, drives self     Are you on dialysis? No     Do you take coumadin? No     Discharge Plan A Home with family     Discharge Plan B Home with family     DME Needed Upon Discharge  none

## 2023-02-07 VITALS
HEIGHT: 73 IN | OXYGEN SATURATION: 99 % | RESPIRATION RATE: 18 BRPM | TEMPERATURE: 98 F | WEIGHT: 226.44 LBS | DIASTOLIC BLOOD PRESSURE: 83 MMHG | BODY MASS INDEX: 30.01 KG/M2 | HEART RATE: 72 BPM | SYSTOLIC BLOOD PRESSURE: 142 MMHG

## 2023-02-07 PROBLEM — T80.92XA TRANSFUSION REACTION: Status: RESOLVED | Noted: 2023-02-05 | Resolved: 2023-02-07

## 2023-02-07 PROBLEM — R50.9 FEVER: Status: RESOLVED | Noted: 2023-02-05 | Resolved: 2023-02-07

## 2023-02-07 LAB
ANION GAP SERPL CALC-SCNC: 5 MMOL/L (ref 8–16)
BUN SERPL-MCNC: 13 MG/DL (ref 6–20)
CALCIUM SERPL-MCNC: 7.9 MG/DL (ref 8.7–10.5)
CHLORIDE SERPL-SCNC: 109 MMOL/L (ref 95–110)
CO2 SERPL-SCNC: 25 MMOL/L (ref 23–29)
CREAT SERPL-MCNC: 1 MG/DL (ref 0.5–1.4)
ERYTHROCYTE [DISTWIDTH] IN BLOOD BY AUTOMATED COUNT: 16 % (ref 11.5–14.5)
EST. GFR  (NO RACE VARIABLE): >60 ML/MIN/1.73 M^2
GLUCOSE SERPL-MCNC: 97 MG/DL (ref 70–110)
HCT VFR BLD AUTO: 21.9 % (ref 40–54)
HCT VFR BLD AUTO: 22.4 % (ref 40–54)
HCT VFR BLD AUTO: 24.7 % (ref 40–54)
HGB BLD-MCNC: 7.3 G/DL (ref 14–18)
HGB BLD-MCNC: 7.5 G/DL (ref 14–18)
HGB BLD-MCNC: 8.2 G/DL (ref 14–18)
MAGNESIUM SERPL-MCNC: 1.8 MG/DL (ref 1.6–2.6)
MCH RBC QN AUTO: 30.5 PG (ref 27–31)
MCHC RBC AUTO-ENTMCNC: 33.5 G/DL (ref 32–36)
MCV RBC AUTO: 91 FL (ref 82–98)
PHOSPHATE SERPL-MCNC: 4.1 MG/DL (ref 2.7–4.5)
PLATELET # BLD AUTO: 129 K/UL (ref 150–450)
PMV BLD AUTO: 10.8 FL (ref 9.2–12.9)
POTASSIUM SERPL-SCNC: 3.3 MMOL/L (ref 3.5–5.1)
RBC # BLD AUTO: 2.46 M/UL (ref 4.6–6.2)
SODIUM SERPL-SCNC: 139 MMOL/L (ref 136–145)
SYMPTOMS P TRANSF RX PATIENT-IMP: NORMAL
WBC # BLD AUTO: 8.07 K/UL (ref 3.9–12.7)

## 2023-02-07 PROCEDURE — 84100 ASSAY OF PHOSPHORUS: CPT | Performed by: INTERNAL MEDICINE

## 2023-02-07 PROCEDURE — 63600175 PHARM REV CODE 636 W HCPCS: Performed by: INTERNAL MEDICINE

## 2023-02-07 PROCEDURE — C9113 INJ PANTOPRAZOLE SODIUM, VIA: HCPCS | Performed by: INTERNAL MEDICINE

## 2023-02-07 PROCEDURE — 85014 HEMATOCRIT: CPT | Performed by: INTERNAL MEDICINE

## 2023-02-07 PROCEDURE — 83735 ASSAY OF MAGNESIUM: CPT | Performed by: INTERNAL MEDICINE

## 2023-02-07 PROCEDURE — 80048 BASIC METABOLIC PNL TOTAL CA: CPT | Performed by: INTERNAL MEDICINE

## 2023-02-07 PROCEDURE — 25000003 PHARM REV CODE 250: Performed by: INTERNAL MEDICINE

## 2023-02-07 PROCEDURE — 85018 HEMOGLOBIN: CPT | Mod: 91 | Performed by: INTERNAL MEDICINE

## 2023-02-07 PROCEDURE — 36415 COLL VENOUS BLD VENIPUNCTURE: CPT | Performed by: INTERNAL MEDICINE

## 2023-02-07 PROCEDURE — 25000003 PHARM REV CODE 250

## 2023-02-07 PROCEDURE — 85018 HEMOGLOBIN: CPT | Performed by: INTERNAL MEDICINE

## 2023-02-07 PROCEDURE — 85027 COMPLETE CBC AUTOMATED: CPT | Performed by: INTERNAL MEDICINE

## 2023-02-07 PROCEDURE — 85014 HEMATOCRIT: CPT | Mod: 91 | Performed by: INTERNAL MEDICINE

## 2023-02-07 RX ORDER — PANTOPRAZOLE SODIUM 40 MG/1
40 TABLET, DELAYED RELEASE ORAL 2 TIMES DAILY
Qty: 120 TABLET | Refills: 0 | Status: SHIPPED | OUTPATIENT
Start: 2023-02-07 | End: 2023-04-08

## 2023-02-07 RX ORDER — PANTOPRAZOLE SODIUM 40 MG/1
40 TABLET, DELAYED RELEASE ORAL DAILY
Qty: 60 TABLET | Refills: 0 | Status: SHIPPED | OUTPATIENT
Start: 2023-02-07 | End: 2023-02-07 | Stop reason: SDUPTHER

## 2023-02-07 RX ADMIN — MUPIROCIN 1 G: 20 OINTMENT TOPICAL at 08:02

## 2023-02-07 RX ADMIN — CHLORHEXIDINE GLUCONATE 15 ML: 1.2 RINSE ORAL at 08:02

## 2023-02-07 RX ADMIN — SODIUM CHLORIDE 8 MG/HR: 9 INJECTION, SOLUTION INTRAVENOUS at 02:02

## 2023-02-07 RX ADMIN — SODIUM CHLORIDE 8 MG/HR: 9 INJECTION, SOLUTION INTRAVENOUS at 08:02

## 2023-02-07 NOTE — SUBJECTIVE & OBJECTIVE
Interval History:    Review of Systems  Objective:     Vital Signs (Most Recent):  Temp: 98 °F (36.7 °C) (02/07/23 0702)  Pulse: 72 (02/07/23 0702)  Resp: 18 (02/07/23 0702)  BP: (!) 142/83 (02/07/23 0702)  SpO2: 99 % (02/07/23 0702)   Vital Signs (24h Range):  Temp:  [98 °F (36.7 °C)-98.6 °F (37 °C)] 98 °F (36.7 °C)  Pulse:  [69-95] 72  Resp:  [18-25] 18  SpO2:  [98 %-100 %] 99 %  BP: (112-146)/(56-83) 142/83     Weight: 102.7 kg (226 lb 6.6 oz)  Body mass index is 29.87 kg/m².    Intake/Output Summary (Last 24 hours) at 2/7/2023 0903  Last data filed at 2/6/2023 1101  Gross per 24 hour   Intake 186.67 ml   Output 650 ml   Net -463.33 ml      Physical Exam    Significant Labs: All pertinent labs within the past 24 hours have been reviewed.    Significant Imaging: I have reviewed all pertinent imaging results/findings within the past 24 hours.

## 2023-02-07 NOTE — NURSING
Patient given discharge instructions understanding verbalized. Ivs discontinued, patient tolerated well. Patient discharged via ambulation to personal vehicle.

## 2023-02-07 NOTE — DISCHARGE SUMMARY
[facility]  Discharge Summary      Patient Name: Galdino Burrell  MRN: [id number]  KAYCE: [id number]  Patient Class: IP- Inpatient  Admission Date: 2/5/2023  Hospital Length of Stay: 2 days  Discharge Date and Time: 2/7/2023 11:53 AM  Attending Physician: No att. providers found   Discharging Provider: Elidia Thao MD  Primary Care Provider: oKrey Abad NP    Primary Care Team: Networked reference to record PCT     HPI:   Mr. Burrell is a 27-year-old male who presented as a direct admission from [facility] for ICU level of care.  Patient reports about 2 weeks ago he was diagnosed with influenza, he was treated with anti nausea and Tamiflu.  He reports 2 week history of progressively worsening generalized weakness with fatigue.  Reports over the last week he was experiencing migraine type headache, nausea with episodes of emesis, did not visualize any bright red blood in emesis, as well as black stool with small amount of bright red blood.  Had mild associated epigastric abdominal discomfort, he felt this was due to reduced oral intake.  Denies any NSAID use.  He has never required blood transfusions in the past.  No past medical history, remote orthopedic procedure, only occasional alcohol use, nonsmoker, no recreational drugs.  At outside hospital on presentation H&H 5/1.5, previous labs 1/22/23 H&H 13.4/42.5.  He was admitted at outside hospital with IV PPI infusion and plans for 3 units PRBC transfusion with GI consultation.  On 1st unit transfusion patient had fever 101.3, with associated seizure activity, transfusion was stopped, transfusion workup was submitted, he responded to Versed, CT head no acute.  This unit of blood was discarded and patient was subsequently transfused 2nd unit PRBC.  He was seen by physicians this morning and concern for ICU level of care and therefore he was transferred to [facility].  He denies fever prior to hospital presentation, states he was experiencing  intermittent palpitation, states he remembers feeling warm, lightheaded/dizzy but does not fully recollect further events of last night.  He is currently alert and oriented, no focal deficits.      Procedure(s) (LRB):  EGD (ESOPHAGOGASTRODUODENOSCOPY) (N/A)      Hospital Course:   Patient was admitted as a transfer from [facility] for higher level of care in the setting of transfusion reaction to PRBC.  Patient initially presented with symptomatic anemia, hemoglobin was found to be 5 from previous normal with concern for symptoms of upper GI bleed.  He had fever with seizure-like activity with PRBC transfusion.  He was admitted to ICU, repeat type and screen sent, 2 further units of PRBC transfused without any further reactions.  He underwent EGD which demonstrated 2 nonbleeding crater duodenal ulcer within duodenal bulb with visible blood vessel status post bipolar cautery.  On 02/06 hemoglobin still 6.8, transfusing 3rd unit of PRBC and monitoring.  Transferring to medical floor.  Currently on IV PPI infusion, gastroenterology recommends b.i.d. PPI for 8 weeks on discharge.  On 02/07, vital stable, hemoglobin stable at 7.5 without any further signs or symptoms of ongoing GI bleed.  Feels well, cleared by consultant for discharge and appears medically stable for discharge with outpatient follow-up.  Counseled patient on diagnosis and treatment plan.  Educational material provided.  Discharge plan including medication, follow-up as well as return precautions were reviewed with the patient, he expressed understanding, no questions or concerns.      Discharge examination   Sitting in bed, alert, oriented, on room air, regular heart rhythm, abdomen nontender       Goals of Care Treatment Preferences:  Code Status: Full Code      Consults:   Consults (From admission, onward)        Status Ordering Provider     Inpatient consult to Gastroenterology  Once        Provider:  Carlos Echeverria MD    Completed SEVEN CALDERA           No new Assessment & Plan notes have been filed under this hospital service since the last note was generated.  Service: Hospital Medicine    Final Active Diagnoses:    Diagnosis Date Noted POA    PRINCIPAL PROBLEM:  Duodenal ulcer [K26.9] 02/06/2023 Yes    Symptomatic anemia [D64.9] 02/05/2023 Yes    Thrombocytopenia [D69.6] 02/05/2023 Yes      Problems Resolved During this Admission:    Diagnosis Date Noted Date Resolved POA    Acute blood loss anemia [D62] 02/05/2023 02/06/2023 Yes    Transfusion reaction [T80.92XA] 02/05/2023 02/07/2023 Yes    Concern for upper GI bleed [K92.2] 02/05/2023 02/06/2023 Yes    Fever [R50.9] 02/05/2023 02/07/2023 Yes       Discharged Condition: good    Disposition: Home or Self Care    Follow Up:   Follow-up Information     [facility] Follow up.    Contact information:  Timmy Abad NP Follow up on 2/10/2023.    Specialty: Family Medicine  Why: 7:40 am  Contact information:  [address] [address]  [phone]                       Patient Instructions:      Diet Adult Regular     Notify your health care provider if you experience any of the following:  severe uncontrolled pain     Notify your health care provider if you experience any of the following:  persistent nausea and vomiting or diarrhea     Notify your health care provider if you experience any of the following:   Order Comments: Blood in stool     Activity as tolerated       Significant Diagnostic Studies: Labs:   BMP:   Recent Labs   Lab 02/06/23  0256 02/07/23  0530    97    139   K 3.5 3.3*   * 109   CO2 25 25   BUN 19 13   CREATININE 1.0 1.0   CALCIUM 7.7* 7.9*   MG 1.9 1.8   , CMP   Recent Labs   Lab 02/06/23  0256 02/07/23  0530    139   K 3.5 3.3*   * 109   CO2 25 25    97   BUN 19 13   CREATININE 1.0 1.0   CALCIUM 7.7* 7.9*   ANIONGAP 5* 5*   , CBC   Recent Labs   Lab 02/06/23  0256 02/06/23  0805  02/07/23  0000 02/07/23  0530 02/07/23  0836   WBC 9.71  --   --  8.07  --    HGB 6.8*   < > 7.3* 7.5* 8.2*   HCT 20.5*   < > 21.9* 22.4* 24.7*   *  --   --  129*  --     < > = values in this interval not displayed.   , INR   Lab Results   Component Value Date    INR 1.1 02/05/2023   , Lipid Panel No results found for: CHOL, HDL, LDLCALC, TRIG, CHOLHDL, Troponin No results for input(s): TROPONINI in the last 168 hours. and A1C: No results for input(s): HGBA1C in the last 4320 hours.    Pending Diagnostic Studies:     Procedure Component Value Units Date/Time    Specimen to Pathology - Surgery [[id number]] Collected: 02/05/23 1417    Order Status: Sent Lab Status: No result     Specimen: Tissue        CT Head Without Contrast    Result Date: 2/5/2023  EXAMINATION: CT HEAD WITHOUT CONTRAST CLINICAL HISTORY: Seizure, new-onset, no history of trauma; TECHNIQUE: Low dose axial images were obtained through the head.  Coronal and sagittal reformations were also performed. Contrast was not administered. COMPARISON: None. FINDINGS: Ventricles and sulci are normal in size for age without evidence of hydrocephalus. The brain parenchyma appears within normal limits.  No definite parenchymal mass, hemorrhage, edema or acute major vascular distribution infarct. No extra-axial blood or fluid collections. No displaced calvarial fracture. The mastoid air cells and visualized paranasal sinuses are essentially clear.     1. No evidence of an acute intracranial abnormality. The preliminary and final reports are concordant. Electronically signed by: Giuseppe Potter Date:    02/05/2023 Time:    07:13    X-Ray Chest AP Portable    Result Date: 2/5/2023  EXAMINATION: XR CHEST AP PORTABLE CLINICAL HISTORY: pain; Unspecified transfusion reaction, initial encounter TECHNIQUE: Single frontal view of the chest was performed. COMPARISON: None FINDINGS: Cardiac and mediastinal silhouettes are within normal limits.    Lungs are clear  bilaterally.     No acute findings. Electronically signed by: Jasvir Rome MD Date:    02/05/2023 Time:    08:10    Medications:  Reconciled Home Medications:      Medication List      START taking these medications    pantoprazole 40 MG tablet  Commonly known as: PROTONIX  Take 1 tablet (40 mg total) by mouth 2 (two) times daily. Take 30 minutes before breakfast and dinner on empty stomach        CONTINUE taking these medications    ondansetron 4 MG Tbdl  Commonly known as: ZOFRAN-ODT  Take 1 tablet (4 mg total) by mouth every 8 (eight) hours as needed (nausea/vomiting).            Indwelling Lines/Drains at time of discharge:   Lines/Drains/Airways     None                 Time spent on the discharge of patient: 32 minutes         Elidia Thao MD  Department of Hospital Medicine  [facility]

## 2023-02-07 NOTE — PLAN OF CARE
Patient cleared for discharge from case management standpoint.    Follow up appointments scheduled and added to AVS.    Chart and discharge orders reviewed.  Patient discharged home with no further case management needs.       02/07/23 0944   Final Note   Assessment Type Final Discharge Note   Anticipated Discharge Disposition Home   Hospital Resources/Appts/Education Provided Provided patient/caregiver with written discharge plan information;Appointments scheduled and added to AVS   Post-Acute Status   Discharge Delays None known at this time

## 2023-02-10 ENCOUNTER — OFFICE VISIT (OUTPATIENT)
Dept: FAMILY MEDICINE | Facility: CLINIC | Age: 28
End: 2023-02-10
Payer: OTHER GOVERNMENT

## 2023-02-10 VITALS
TEMPERATURE: 99 F | BODY MASS INDEX: 29.33 KG/M2 | HEART RATE: 62 BPM | WEIGHT: 221.31 LBS | SYSTOLIC BLOOD PRESSURE: 120 MMHG | OXYGEN SATURATION: 99 % | HEIGHT: 73 IN | DIASTOLIC BLOOD PRESSURE: 60 MMHG

## 2023-02-10 DIAGNOSIS — Z09 HOSPITAL DISCHARGE FOLLOW-UP: Primary | ICD-10-CM

## 2023-02-10 DIAGNOSIS — K26.9 DUODENAL ULCER: ICD-10-CM

## 2023-02-10 DIAGNOSIS — D64.9 ANEMIA, UNSPECIFIED TYPE: ICD-10-CM

## 2023-02-10 DIAGNOSIS — D69.6 THROMBOCYTOPENIA: ICD-10-CM

## 2023-02-10 DIAGNOSIS — Z13.220 SCREENING FOR HYPERLIPIDEMIA: ICD-10-CM

## 2023-02-10 LAB — BACTERIA BLD CULT: NORMAL

## 2023-02-10 PROCEDURE — 99215 OFFICE O/P EST HI 40 MIN: CPT | Mod: PBBFAC,PO | Performed by: NURSE PRACTITIONER

## 2023-02-10 PROCEDURE — 99213 PR OFFICE/OUTPT VISIT, EST, LEVL III, 20-29 MIN: ICD-10-PCS | Mod: S$PBB,,, | Performed by: NURSE PRACTITIONER

## 2023-02-10 PROCEDURE — 99999 PR PBB SHADOW E&M-EST. PATIENT-LVL V: CPT | Mod: PBBFAC,,, | Performed by: NURSE PRACTITIONER

## 2023-02-10 PROCEDURE — 99213 OFFICE O/P EST LOW 20 MIN: CPT | Mod: S$PBB,,, | Performed by: NURSE PRACTITIONER

## 2023-02-10 PROCEDURE — 99999 PR PBB SHADOW E&M-EST. PATIENT-LVL V: ICD-10-PCS | Mod: PBBFAC,,, | Performed by: NURSE PRACTITIONER

## 2023-02-10 NOTE — PROGRESS NOTES
Subjective:       Patient ID: Galdino Burrell is a 27 y.o. male.    Chief Complaint: hospital follow up     HPI   26 y/o male patient with medical problems listed below presents for hospital discharge follow up. Patient is new to the clinic.     Admission Date: 2/4/2023  Hospital Length of Stay: 1 days  Discharge Date and Time:  02/05/2023 transferred to [facility]    Patient presented to ED complaining of generalized weakness and fatigue. Of note patient was recently diagnosed with influenza and started on Tamiflu which he completed.  He reported profound weakness and fatigue onset approximately 2 weeks ago and progressively worsened. He also endorsed black tarry stools. He reported in periumbilical pain described as a cramping sensation. That pain was alleviated with the eating. ER workup:  CBC with anemia of 5 and 15. Leukocytosis of 12,000. CMP with BUN of 23 creatinine is 0.9 and albumin of 2.9 ER physician reported gross blood on rectal exam.  Patient admitted to Hospital Medicine for treatment management. Start patient on Protonix infusion as well as transfused 3 units of packed red cells.    Hospital Course:   Patient without significant medical history admitted with fatigue, black tarry stools, and abdominal pain found to be acutely anemic with hemoglobin of 4.9. Tachycardic but otherwise stable vitals on admit. Initiated on GI bleed pathway. IV PPI given. 3u pRBC ordered. Shortly after beginning the 1st unit of blood, the patient developed a transfusion reaction consisting of fever and a seizure which was aborted with ativan. He remained drowsy post-ictal and post-ativan but was fully oriented. CT head w/o without acute findings. He was able to complete 1 unit of blood with repeat hemoglobin up to 5.4. GI team assessed the patient and did not feel comfortable keeping patient at this facility due to lack of ICU and would prefer to perform endoscopy where there was ICU availability. Remained tachycardic with  stable blood pressure. Transfer was initiated for higher level of care and he was accepted at [facility].     Overview/Hospital Course:  Patient was admitted as a transfer from [facility] for higher level of care in the setting of transfusion reaction to PRBC.  Patient initially presented with symptomatic anemia, hemoglobin was found to be 5 from previous normal with concern for symptoms of upper GI bleed.  He had fever with seizure-like activity with PRBC transfusion. He was admitted to ICU, repeat type and screen sent, 2 further units of PRBC transfused without any further reactions. He underwent EGD which demonstrated to nonbleeding crater duodenal ulcer within duodenal bulb with visible blood vessel status post bipolar cautery. On 02/06 hemoglobin still 6.8, transfusing 3rd unit of PRBC and monitoring. Transferring to medical floor.  Currently on IV PPI infusion, gastroenterology recommends b.i.d. PPI for 8 weeks on discharge.    Today patient states doing well since after hospital discharge. Denies dizziness, chest pain, sob, nausea, vomiting, abdominal pain, urinary symptoms, hematochezia or melena, fever, chills, generalized body ache or weakness. Denies smoking or drug use, rarely etoh. Denies hx of blood transfusion in the past. Patient is able to tolerate regular diet. Patient is currently taking Protonix 40 mg bid daily.     Labs reviewed- H&H 8.2/24.7 on 2/7, Plt 129, K 3.3  EGD 2/5/2023    Patient Active Problem List   Diagnosis    Symptomatic anemia    Thrombocytopenia    Duodenal ulcer      Review of patient's allergies indicates:  No Known Allergies    Past Surgical History:   Procedure Laterality Date    ESOPHAGOGASTRODUODENOSCOPY N/A 2/5/2023    Procedure: EGD (ESOPHAGOGASTRODUODENOSCOPY);  Surgeon: Carlos Echeverria MD;  Location: [facility];  Service: Gastroenterology;  Laterality: N/A;    ORTHOPEDIC SURGERY          Current Outpatient Medications:     pantoprazole (PROTONIX) 40 MG tablet,  "Take 1 tablet (40 mg total) by mouth 2 (two) times daily. Take 30 minutes before breakfast and dinner on empty stomach, Disp: 120 tablet, Rfl: 0    ondansetron (ZOFRAN-ODT) 4 MG TbDL, Take 1 tablet (4 mg total) by mouth every 8 (eight) hours as needed (nausea/vomiting). (Patient not taking: Reported on 2/10/2023), Disp: 12 tablet, Rfl: 0    Lab Results   Component Value Date    WBC 8.07 02/07/2023    HGB 8.2 (L) 02/07/2023    HCT 24.7 (L) 02/07/2023     (L) 02/07/2023    ALT 22 02/05/2023    AST 15 02/05/2023     02/07/2023    K 3.3 (L) 02/07/2023     02/07/2023    CREATININE 1.0 02/07/2023    BUN 13 02/07/2023    CO2 25 02/07/2023    INR 1.1 02/05/2023     Review of Systems   Constitutional:  Negative for chills and fever.   Respiratory:  Negative for cough, chest tightness and shortness of breath.    Cardiovascular:  Negative for chest pain and palpitations.   Gastrointestinal:  Negative for abdominal pain.   Neurological:  Negative for dizziness and headaches.       Objective:   /60 (BP Location: Right arm, Patient Position: Sitting, BP Method: Medium (Manual))   Pulse 62   Temp 99.2 °F (37.3 °C) (Oral)   Ht 6' 1" (1.854 m)   Wt 100.4 kg (221 lb 5.5 oz)   SpO2 99%   BMI 29.20 kg/m²         Physical Exam  Vitals reviewed.   Constitutional:       General: He is not in acute distress.     Appearance: Normal appearance.   Cardiovascular:      Rate and Rhythm: Normal rate and regular rhythm.      Pulses: Normal pulses.      Heart sounds: Normal heart sounds.   Pulmonary:      Effort: Pulmonary effort is normal.      Breath sounds: Normal breath sounds.   Chest:      Chest wall: No deformity, swelling or edema.   Abdominal:      General: Abdomen is flat. Bowel sounds are normal.      Palpations: Abdomen is soft.   Musculoskeletal:      Cervical back: Normal range of motion.   Skin:     General: Skin is warm and dry.   Neurological:      General: No focal deficit present.      Mental " Status: He is alert.   Psychiatric:         Mood and Affect: Mood normal.         Behavior: Behavior normal.       Assessment:       1. Hospital discharge follow-up    2. Thrombocytopenia    3. Anemia, unspecified type    4. Duodenal ulcer    5. Screening for hyperlipidemia        Plan:       1. Thrombocytopenia  - CBC Auto Differential; Future    2. Anemia, unspecified type  - Patient is NAD, VSS  - CBC Auto Differential; Future  - TSH; Future  - FERRITIN; Future  - IRON AND TIBC; Future  - Ambulatory referral/consult to Hematology / Oncology; Future    3. Duodenal ulcer  - EGD 2/5/: Non-bleeding duodenal ulcers with a visible                       vessel. Treated with bipolar cautery (Path: gastric oxyntic mucosa with chronic inactive gastritis, negative for h pylori)  - CBC Auto Differential; Future  - Comprehensive Metabolic Panel; Future  - Ambulatory referral/consult to Gastroenterology; Future  - Continue with PPI     4. Screening for hyperlipidemia  - Lipid Panel; Future    5. Hospital discharge follow-up    Patient with be reevaluated in 3 months or sooner daniel Vasquez NP

## 2023-02-12 ENCOUNTER — LAB VISIT (OUTPATIENT)
Dept: LAB | Facility: HOSPITAL | Age: 28
End: 2023-02-12
Attending: NURSE PRACTITIONER
Payer: OTHER GOVERNMENT

## 2023-02-12 DIAGNOSIS — Z13.220 SCREENING FOR HYPERLIPIDEMIA: ICD-10-CM

## 2023-02-12 DIAGNOSIS — K26.9 DUODENAL ULCER: ICD-10-CM

## 2023-02-12 DIAGNOSIS — D64.9 ANEMIA, UNSPECIFIED TYPE: ICD-10-CM

## 2023-02-12 DIAGNOSIS — D69.6 THROMBOCYTOPENIA: ICD-10-CM

## 2023-02-12 LAB
ALBUMIN SERPL BCP-MCNC: 3.5 G/DL (ref 3.5–5.2)
ALP SERPL-CCNC: 77 U/L (ref 55–135)
ALT SERPL W/O P-5'-P-CCNC: 65 U/L (ref 10–44)
ANION GAP SERPL CALC-SCNC: 6 MMOL/L (ref 8–16)
AST SERPL-CCNC: 23 U/L (ref 10–40)
BASOPHILS # BLD AUTO: 0.03 K/UL (ref 0–0.2)
BASOPHILS NFR BLD: 0.5 % (ref 0–1.9)
BILIRUB SERPL-MCNC: 0.2 MG/DL (ref 0.1–1)
BUN SERPL-MCNC: 10 MG/DL (ref 6–20)
CALCIUM SERPL-MCNC: 9.1 MG/DL (ref 8.7–10.5)
CHLORIDE SERPL-SCNC: 108 MMOL/L (ref 95–110)
CHOLEST SERPL-MCNC: 130 MG/DL (ref 120–199)
CHOLEST/HDLC SERPL: 3 {RATIO} (ref 2–5)
CO2 SERPL-SCNC: 25 MMOL/L (ref 23–29)
CREAT SERPL-MCNC: 1.1 MG/DL (ref 0.5–1.4)
DIFFERENTIAL METHOD: ABNORMAL
EOSINOPHIL # BLD AUTO: 0.3 K/UL (ref 0–0.5)
EOSINOPHIL NFR BLD: 4.5 % (ref 0–8)
ERYTHROCYTE [DISTWIDTH] IN BLOOD BY AUTOMATED COUNT: 16.7 % (ref 11.5–14.5)
EST. GFR  (NO RACE VARIABLE): >60 ML/MIN/1.73 M^2
FERRITIN SERPL-MCNC: 85 NG/ML (ref 20–300)
GLUCOSE SERPL-MCNC: 96 MG/DL (ref 70–110)
HCT VFR BLD AUTO: 30.1 % (ref 40–54)
HDLC SERPL-MCNC: 44 MG/DL (ref 40–75)
HDLC SERPL: 33.8 % (ref 20–50)
HGB BLD-MCNC: 9.6 G/DL (ref 14–18)
IMM GRANULOCYTES # BLD AUTO: 0.02 K/UL (ref 0–0.04)
IMM GRANULOCYTES NFR BLD AUTO: 0.4 % (ref 0–0.5)
IRON SERPL-MCNC: 41 UG/DL (ref 45–160)
LDLC SERPL CALC-MCNC: 71.8 MG/DL (ref 63–159)
LYMPHOCYTES # BLD AUTO: 2.1 K/UL (ref 1–4.8)
LYMPHOCYTES NFR BLD: 38.1 % (ref 18–48)
MCH RBC QN AUTO: 30 PG (ref 27–31)
MCHC RBC AUTO-ENTMCNC: 31.9 G/DL (ref 32–36)
MCV RBC AUTO: 94 FL (ref 82–98)
MONOCYTES # BLD AUTO: 0.4 K/UL (ref 0.3–1)
MONOCYTES NFR BLD: 7.5 % (ref 4–15)
NEUTROPHILS # BLD AUTO: 2.7 K/UL (ref 1.8–7.7)
NEUTROPHILS NFR BLD: 49 % (ref 38–73)
NONHDLC SERPL-MCNC: 86 MG/DL
NRBC BLD-RTO: 0 /100 WBC
PLATELET # BLD AUTO: 258 K/UL (ref 150–450)
PLATELET BLD QL SMEAR: ABNORMAL
PMV BLD AUTO: 10.1 FL (ref 9.2–12.9)
POTASSIUM SERPL-SCNC: 4.1 MMOL/L (ref 3.5–5.1)
PROT SERPL-MCNC: 6.6 G/DL (ref 6–8.4)
RBC # BLD AUTO: 3.2 M/UL (ref 4.6–6.2)
SATURATED IRON: 11 % (ref 20–50)
SODIUM SERPL-SCNC: 139 MMOL/L (ref 136–145)
T4 FREE SERPL-MCNC: 0.91 NG/DL (ref 0.71–1.51)
TOTAL IRON BINDING CAPACITY: 377 UG/DL (ref 250–450)
TRANSFERRIN SERPL-MCNC: 255 MG/DL (ref 200–375)
TRIGL SERPL-MCNC: 71 MG/DL (ref 30–150)
TSH SERPL DL<=0.005 MIU/L-ACNC: 0.36 UIU/ML (ref 0.4–4)
WBC # BLD AUTO: 5.59 K/UL (ref 3.9–12.7)

## 2023-02-12 PROCEDURE — 82728 ASSAY OF FERRITIN: CPT | Performed by: NURSE PRACTITIONER

## 2023-02-12 PROCEDURE — 84439 ASSAY OF FREE THYROXINE: CPT | Performed by: NURSE PRACTITIONER

## 2023-02-12 PROCEDURE — 85025 COMPLETE CBC W/AUTO DIFF WBC: CPT | Performed by: NURSE PRACTITIONER

## 2023-02-12 PROCEDURE — 80061 LIPID PANEL: CPT | Performed by: NURSE PRACTITIONER

## 2023-02-12 PROCEDURE — 36415 COLL VENOUS BLD VENIPUNCTURE: CPT | Performed by: NURSE PRACTITIONER

## 2023-02-12 PROCEDURE — 84466 ASSAY OF TRANSFERRIN: CPT | Performed by: NURSE PRACTITIONER

## 2023-02-12 PROCEDURE — 80053 COMPREHEN METABOLIC PANEL: CPT | Performed by: NURSE PRACTITIONER

## 2023-02-12 PROCEDURE — 84443 ASSAY THYROID STIM HORMONE: CPT | Performed by: NURSE PRACTITIONER

## 2023-03-07 ENCOUNTER — TELEPHONE (OUTPATIENT)
Dept: GASTROENTEROLOGY | Facility: CLINIC | Age: 28
End: 2023-03-07
Payer: OTHER GOVERNMENT

## 2023-03-07 NOTE — TELEPHONE ENCOUNTER
----- Message from Ana Laura Kwan sent at 3/7/2023  9:40 AM CST -----  Regarding: return call  Contact: Patient  Type:  Patient Returning Call    Who Called:  Patient   Who Left Message for Patient:    Does the patient know what this is regarding?:    Best Call Back Number:  [phone] (home)      Additional Information:  Patient stated that he was returning a call. Please contact patient to advise. Thanks!

## 2023-03-08 ENCOUNTER — OFFICE VISIT (OUTPATIENT)
Dept: GASTROENTEROLOGY | Facility: CLINIC | Age: 28
End: 2023-03-08
Payer: OTHER GOVERNMENT

## 2023-03-08 ENCOUNTER — TELEPHONE (OUTPATIENT)
Dept: HEMATOLOGY/ONCOLOGY | Facility: CLINIC | Age: 28
End: 2023-03-08
Payer: OTHER GOVERNMENT

## 2023-03-08 VITALS — WEIGHT: 216.06 LBS | HEIGHT: 73 IN | BODY MASS INDEX: 28.63 KG/M2

## 2023-03-08 DIAGNOSIS — Z87.19 HISTORY OF MELENA: ICD-10-CM

## 2023-03-08 DIAGNOSIS — D64.9 ANEMIA, UNSPECIFIED TYPE: ICD-10-CM

## 2023-03-08 DIAGNOSIS — Z09 HOSPITAL DISCHARGE FOLLOW-UP: Primary | ICD-10-CM

## 2023-03-08 DIAGNOSIS — K26.9 DUODENAL ULCER: ICD-10-CM

## 2023-03-08 DIAGNOSIS — R74.8 ELEVATED LIVER ENZYMES: ICD-10-CM

## 2023-03-08 DIAGNOSIS — Z87.19 HISTORY OF RECTAL BLEEDING: ICD-10-CM

## 2023-03-08 DIAGNOSIS — R63.4 WEIGHT LOSS: ICD-10-CM

## 2023-03-08 DIAGNOSIS — Z87.898 HISTORY OF NAUSEA AND VOMITING: ICD-10-CM

## 2023-03-08 PROCEDURE — 99203 PR OFFICE/OUTPT VISIT, NEW, LEVL III, 30-44 MIN: ICD-10-PCS | Mod: S$PBB,,,

## 2023-03-08 PROCEDURE — 99999 PR PBB SHADOW E&M-EST. PATIENT-LVL IV: ICD-10-PCS | Mod: PBBFAC,,,

## 2023-03-08 PROCEDURE — 99214 OFFICE O/P EST MOD 30 MIN: CPT | Mod: PBBFAC,PN

## 2023-03-08 PROCEDURE — 99999 PR PBB SHADOW E&M-EST. PATIENT-LVL IV: CPT | Mod: PBBFAC,,,

## 2023-03-08 PROCEDURE — 99203 OFFICE O/P NEW LOW 30 MIN: CPT | Mod: S$PBB,,,

## 2023-03-08 NOTE — PROGRESS NOTES
"Subjective:       Patient ID: Galdino Burrell is a 27 y.o. male Body mass index is 28.5 kg/m².    Chief Complaint: GI Problem    This patient is new to me.  Referring Provider: Korey Abad for duodenal ulcer.  Established patient of Dr. Echeverria.     Reviewed hospital discharge summary report from 02/07/2023,  "Admission Date: 2/5/2023  Hospital Length of Stay: 2 days  Discharge Date and Time: 2/7/2023 11:53 AM  Attending Physician: No att. providers found   Discharging Provider: Elidia Thao MD  Primary Care Provider: Korey Abad NP     Primary Care Team: Networked reference to record PCT      HPI: Mr. Burrell is a 27-year-old male who presented as a direct admission from [facility] for ICU level of care.  Patient reports about 2 weeks ago he was diagnosed with influenza, he was treated with anti nausea and Tamiflu.  He reports 2 week history of progressively worsening generalized weakness with fatigue.  Reports over the last week he was experiencing migraine type headache, nausea with episodes of emesis, did not visualize any bright red blood in emesis, as well as black stool with small amount of bright red blood.  Had mild associated epigastric abdominal discomfort, he felt this was due to reduced oral intake.  Denies any NSAID use.  He has never required blood transfusions in the past.  No past medical history, remote orthopedic procedure, only occasional alcohol use, nonsmoker, no recreational drugs.  At outside hospital on presentation H&H 5/1.5, previous labs 1/22/23 H&H 13.4/42.5.  He was admitted at outside hospital with IV PPI infusion and plans for 3 units PRBC transfusion with GI consultation.  On 1st unit transfusion patient had fever 101.3, with associated seizure activity, transfusion was stopped, transfusion workup was submitted, he responded to Versed, CT head no acute.  This unit of blood was discarded and patient was subsequently transfused 2nd unit PRBC.  He was seen by physicians this morning and " "concern for ICU level of care and therefore he was transferred to [facility].  He denies fever prior to hospital presentation, states he was experiencing intermittent palpitation, states he remembers feeling warm, lightheaded/dizzy but does not fully recollect further events of last night.  He is currently alert and oriented, no focal deficits.     Procedure(s) (LRB): EGD (ESOPHAGOGASTRODUODENOSCOPY) (N/A)       Hospital Course: Patient was admitted as a transfer from [facility] for higher level of care in the setting of transfusion reaction to PRBC.  Patient initially presented with symptomatic anemia, hemoglobin was found to be 5 from previous normal with concern for symptoms of upper GI bleed.  He had fever with seizure-like activity with PRBC transfusion.  He was admitted to ICU, repeat type and screen sent, 2 further units of PRBC transfused without any further reactions.  He underwent EGD which demonstrated 2 nonbleeding crater duodenal ulcer within duodenal bulb with visible blood vessel status post bipolar cautery.  On 02/06 hemoglobin still 6.8, transfusing 3rd unit of PRBC and monitoring.  Transferring to medical floor.  Currently on IV PPI infusion, gastroenterology recommends b.i.d. PPI for 8 weeks on discharge.  On 02/07, vital stable, hemoglobin stable at 7.5 without any further signs or symptoms of ongoing GI bleed.  Feels well, cleared by consultant for discharge and appears medically stable for discharge with outpatient follow-up.  Counseled patient on diagnosis and treatment plan.  Educational material provided.  Discharge plan including medication, follow-up as well as return precautions were reviewed with the patient, he expressed understanding, no questions or concerns."    GI Problem  The primary symptoms include weight loss (Documented weight loss of 10 lbs since 02/05/2023; reports a decreased appetite prior to treatment for duodenal ulcer; appetite has improved significantly), abdominal pain (hx of of " epigastric discomfort prior to ER visit), nausea (resolved; denies Zofran use), vomiting (resolved; reports prior to ER visit having to vomit every time he ate), melena (resolved) and hematochezia (resolved; reports one episode noticing a small amount of BRBPR with BM prior to ER visit). Primary symptoms do not include fever, fatigue, diarrhea (Typically has 1-2 BMs daily rated stool 4 on [place] scale), hematemesis, jaundice or dysuria.   The illness does not include chills, anorexia, dysphagia, odynophagia, bloating or constipation. Associated symptoms comments: EGD 02/05/23 - Normal esophagus. Erythematous mucosa in the gastric fundus. Biopsied. Non-bleeding duodenal ulcers with a visible vessel. Treated with bipolar cautery; patho:  Benign.  No H pylori bacteria. Significant associated medical issues include PUD (History of duodenal ulcer 02/05/2023; currently taking Protonix 40 mg b.i.d. x8 weeks). Associated medical issues do not include inflammatory bowel disease, GERD, gallstones, liver disease, alcohol abuse, gastric bypass, bowel resection, irritable bowel syndrome, hemorrhoids or diverticulitis.     Review of Systems   Constitutional:  Positive for appetite change (improved) and weight loss (Documented weight loss of 10 lbs since 02/05/2023; reports a decreased appetite prior to treatment for duodenal ulcer; appetite has improved significantly). Negative for activity change, chills, diaphoresis, fatigue, fever and unexpected weight change.   HENT:  Negative for sore throat and trouble swallowing.    Respiratory:  Negative for cough, choking and shortness of breath.    Cardiovascular:  Negative for chest pain.   Gastrointestinal:  Positive for abdominal pain (hx of of epigastric discomfort prior to ER visit), anal bleeding (resolved), hematochezia (resolved; reports one episode noticing a small amount of BRBPR with BM prior to ER visit), melena (resolved), nausea (resolved; denies Zofran use) and vomiting  (resolved; reports prior to ER visit having to vomit every time he ate). Negative for anorexia, bloating, blood in stool, constipation, diarrhea (Typically has 1-2 BMs daily rated stool 4 on [place] scale), dysphagia, hematemesis, jaundice and rectal pain.   Genitourinary:  Negative for dysuria.       No LMP for male patient.  History reviewed. No pertinent past medical history.  Past Surgical History:   Procedure Laterality Date    ESOPHAGOGASTRODUODENOSCOPY N/A 02/05/2023    Procedure: EGD (ESOPHAGOGASTRODUODENOSCOPY);  Surgeon: Carlos Echeverria MD;  Location: [facility];  Service: Gastroenterology;  Laterality: N/A;    ORTHOPEDIC SURGERY      UPPER GASTROINTESTINAL ENDOSCOPY       Family History   Problem Relation Age of Onset    Diabetes Mother     Hypertension Mother     Hypertension Father     Diabetes Father     Colon cancer Neg Hx     Colon polyps Neg Hx     Esophageal cancer Neg Hx     Stomach cancer Neg Hx      Social History     Tobacco Use    Smoking status: Never    Smokeless tobacco: Never   Substance Use Topics    Alcohol use: Yes     Comment: occasionally - not regularly    Drug use: Never     Wt Readings from Last 10 Encounters:   03/08/23 98 kg (216 lb 0.8 oz)   02/10/23 100.4 kg (221 lb 5.5 oz)   02/05/23 102.7 kg (226 lb 6.6 oz)   02/05/23 101.1 kg (222 lb 14.2 oz)   02/05/23 101 kg (222 lb 10.6 oz)   01/22/23 99 kg (218 lb 4.1 oz)     Lab Results   Component Value Date    WBC 5.59 02/12/2023    HGB 9.6 (L) 02/12/2023    HCT 30.1 (L) 02/12/2023    MCV 94 02/12/2023     02/12/2023     CMP  Sodium   Date Value Ref Range Status   02/12/2023 139 136 - 145 mmol/L Final     Potassium   Date Value Ref Range Status   02/12/2023 4.1 3.5 - 5.1 mmol/L Final     Chloride   Date Value Ref Range Status   02/12/2023 108 95 - 110 mmol/L Final     CO2   Date Value Ref Range Status   02/12/2023 25 23 - 29 mmol/L Final     Glucose   Date Value Ref Range Status   02/12/2023 96 70 - 110 mg/dL Final     BUN    Date Value Ref Range Status   02/12/2023 10 6 - 20 mg/dL Final     Creatinine   Date Value Ref Range Status   02/12/2023 1.1 0.5 - 1.4 mg/dL Final     Calcium   Date Value Ref Range Status   02/12/2023 9.1 8.7 - 10.5 mg/dL Final     Total Protein   Date Value Ref Range Status   02/12/2023 6.6 6.0 - 8.4 g/dL Final     Albumin   Date Value Ref Range Status   02/12/2023 3.5 3.5 - 5.2 g/dL Final     Total Bilirubin   Date Value Ref Range Status   02/12/2023 0.2 0.1 - 1.0 mg/dL Final     Comment:     For infants and newborns, interpretation of results should be based  on gestational age, weight and in agreement with clinical  observations.    Premature Infant recommended reference ranges:  Up to 24 hours.............<8.0 mg/dL  Up to 48 hours............<12.0 mg/dL  3-5 days..................<15.0 mg/dL  6-29 days.................<15.0 mg/dL       Alkaline Phosphatase   Date Value Ref Range Status   02/12/2023 77 55 - 135 U/L Final     AST   Date Value Ref Range Status   02/12/2023 23 10 - 40 U/L Final     ALT   Date Value Ref Range Status   02/12/2023 65 (H) 10 - 44 U/L Final     Anion Gap   Date Value Ref Range Status   02/12/2023 6 (L) 8 - 16 mmol/L Final     Lab Results   Component Value Date    LIPASE 20 01/22/2023     Lab Results   Component Value Date    TSH 0.365 (L) 02/12/2023     Lab Results   Component Value Date    IRON 41 (L) 02/12/2023    TRANSFERRIN 255 02/12/2023    TIBC 377 02/12/2023    FESATURATED 11 (L) 02/12/2023      Lab Results   Component Value Date    FERRITIN 85 02/12/2023     Reviewed prior medical records including radiology report of chest x-ray 02/05/2023 & endoscopy history (see surgical history).    Objective:      Physical Exam  Vitals and nursing note reviewed.   Constitutional:       General: He is not in acute distress.     Appearance: Normal appearance. He is not ill-appearing.   HENT:      Mouth/Throat:      Lips: Pink. No lesions.   Cardiovascular:      Rate and Rhythm: Normal  rate and regular rhythm.      Pulses: Normal pulses.   Pulmonary:      Effort: Pulmonary effort is normal. No respiratory distress.   Abdominal:      General: Abdomen is protuberant. Bowel sounds are normal. There is no distension or abdominal bruit. There are no signs of injury.      Palpations: Abdomen is soft. There is no shifting dullness, fluid wave, hepatomegaly, splenomegaly or mass.      Tenderness: There is no abdominal tenderness. There is no guarding or rebound. Negative signs include Hale's sign, Rovsing's sign and McBurney's sign.      Hernia: No hernia is present.   Skin:     General: Skin is warm and dry.      Coloration: Skin is not jaundiced or pale.   Neurological:      Mental Status: He is alert and oriented to person, place, and time.   Psychiatric:         Attention and Perception: Attention normal.         Mood and Affect: Mood normal.         Speech: Speech normal.         Behavior: Behavior normal.       Assessment:       1. Hospital discharge follow-up    2. History of Duodenal ulcer    3. History of melena    4. History of rectal bleeding    5. Anemia, unspecified type    6. Elevated liver enzymes    7. History of nausea and vomiting    8. Weight loss        Plan:       Hospital discharge follow-up    History of Duodenal ulcer  -continue Protonix 40 mg b.i.d. for recommended 8 weeks  -Educated patient to avoid large meals, avoid eating within 2-3 hours of bedtime (avoid late night eating & lying down soon after eating), elevate head of bed if nocturnal symptoms are present, smoking cessation (if current smoker), & weight loss (if overweight).   -Educated to minimize/avoid high-fat foods, chocolate, caffeine, citrus, alcohol, & tomato products.  -Advised to avoid/limit use of NSAID's, since they can cause GI upset, bleeding, and/or ulcers. If needed, take with food.     History of melena & History of rectal bleeding  -resolved    Anemia, unspecified type  - discussed with patient the  different ways that anemia occurs: blood loss (such as from the gi tract), the body is not making enough, or the body is breaking down the rbcs too quickly; consider colonoscopy to further evaluate gi tract for possible blood loss and pending results of endoscopies, possible UGI with Small Bowel Follow Through/video capsule study  -will consider colonoscopy pending repeat blood work  -follow-up with PCP and/or hematology for continued evaluation and management  -     CBC Without Differential; Future; Expected date: 03/08/2023  -     Ambulatory referral/consult to Hematology / Oncology; Future; Expected date: 03/15/2023  -     IRON AND TIBC; Future; Expected date: 03/08/2023  -     Ferritin; Future; Expected date: 03/08/2023    Elevated liver enzymes  -Recommended low fat, low cholesterol diet, maintain good control of blood sugars and cholesterol levels, exercise, weight loss (if overweight), minimize/avoid alcohol and tylenol products, & follow-up with PCP for continued evaluation and management; if specialist is needed, recommend seeing hepatology.  -     Hepatic Function Panel; Future; Expected date: 03/08/2023  -     US Abdomen Limited (LIVER); Future; Expected date: 03/08/2023  -     Hepatitis Panel, Acute; Future; Expected date: 03/08/2023    History of nausea and vomiting  -resolved    Weight loss  -hx of decreased appetite that has improved  - encourage PO intake and daily calorie counts to ensure adequate nutrition is taken in, recommend at least 2,000 calories a day  - recommend nutritional drinks, such as Boost, Ensure or Glucerna, to supplement nutrition needs    Follow up in about 4 weeks (around 4/5/2023), or if symptoms worsen or fail to improve.      If no improvement in symptoms or symptoms worsen, call/follow-up at clinic or go to ER.        30 minutes of total time spent on the encounter, which includes face to face time and non-face to face time preparing to see the patient (eg, review of tests),  Obtaining and/or reviewing separately obtained history, Documenting clinical information in the electronic or other health record, Independently interpreting results (not separately reported) and communicating results to the patient/family/caregiver, or Care coordination (not separately reported).     A dictation software program was used for this note. Please expect some simple typographical  errors in this note.

## 2023-03-10 ENCOUNTER — OFFICE VISIT (OUTPATIENT)
Dept: HEMATOLOGY/ONCOLOGY | Facility: CLINIC | Age: 28
End: 2023-03-10
Payer: OTHER GOVERNMENT

## 2023-03-10 ENCOUNTER — TELEPHONE (OUTPATIENT)
Dept: FAMILY MEDICINE | Facility: CLINIC | Age: 28
End: 2023-03-10
Payer: OTHER GOVERNMENT

## 2023-03-10 ENCOUNTER — TELEPHONE (OUTPATIENT)
Dept: HEMATOLOGY/ONCOLOGY | Facility: CLINIC | Age: 28
End: 2023-03-10

## 2023-03-10 ENCOUNTER — LAB VISIT (OUTPATIENT)
Dept: LAB | Facility: HOSPITAL | Age: 28
End: 2023-03-10
Attending: INTERNAL MEDICINE
Payer: OTHER GOVERNMENT

## 2023-03-10 VITALS
TEMPERATURE: 97 F | RESPIRATION RATE: 12 BRPM | DIASTOLIC BLOOD PRESSURE: 72 MMHG | OXYGEN SATURATION: 98 % | HEART RATE: 83 BPM | WEIGHT: 216.25 LBS | HEIGHT: 72 IN | BODY MASS INDEX: 29.29 KG/M2 | SYSTOLIC BLOOD PRESSURE: 132 MMHG

## 2023-03-10 DIAGNOSIS — D64.9 ANEMIA, UNSPECIFIED TYPE: ICD-10-CM

## 2023-03-10 DIAGNOSIS — D50.0 IRON DEFICIENCY ANEMIA DUE TO CHRONIC BLOOD LOSS: ICD-10-CM

## 2023-03-10 DIAGNOSIS — D50.0 IRON DEFICIENCY ANEMIA DUE TO CHRONIC BLOOD LOSS: Primary | ICD-10-CM

## 2023-03-10 LAB
BASOPHILS # BLD AUTO: 0.04 K/UL (ref 0–0.2)
BASOPHILS NFR BLD: 0.6 % (ref 0–1.9)
DIFFERENTIAL METHOD: ABNORMAL
EOSINOPHIL # BLD AUTO: 0.1 K/UL (ref 0–0.5)
EOSINOPHIL NFR BLD: 1.9 % (ref 0–8)
ERYTHROCYTE [DISTWIDTH] IN BLOOD BY AUTOMATED COUNT: 14.6 % (ref 11.5–14.5)
FERRITIN SERPL-MCNC: 18 NG/ML (ref 20–300)
HCT VFR BLD AUTO: 36.9 % (ref 40–54)
HGB BLD-MCNC: 11.4 G/DL (ref 14–18)
IMM GRANULOCYTES # BLD AUTO: 0.02 K/UL (ref 0–0.04)
IMM GRANULOCYTES NFR BLD AUTO: 0.3 % (ref 0–0.5)
IRON SERPL-MCNC: 30 UG/DL (ref 45–160)
LYMPHOCYTES # BLD AUTO: 2.3 K/UL (ref 1–4.8)
LYMPHOCYTES NFR BLD: 34.9 % (ref 18–48)
MCH RBC QN AUTO: 27.9 PG (ref 27–31)
MCHC RBC AUTO-ENTMCNC: 30.9 G/DL (ref 32–36)
MCV RBC AUTO: 90 FL (ref 82–98)
MONOCYTES # BLD AUTO: 0.6 K/UL (ref 0.3–1)
MONOCYTES NFR BLD: 9.3 % (ref 4–15)
NEUTROPHILS # BLD AUTO: 3.4 K/UL (ref 1.8–7.7)
NEUTROPHILS NFR BLD: 53 % (ref 38–73)
NRBC BLD-RTO: 0 /100 WBC
PLATELET # BLD AUTO: 250 K/UL (ref 150–450)
PMV BLD AUTO: 11 FL (ref 9.2–12.9)
RBC # BLD AUTO: 4.09 M/UL (ref 4.6–6.2)
SATURATED IRON: 7 % (ref 20–50)
TOTAL IRON BINDING CAPACITY: 428 UG/DL (ref 250–450)
TRANSFERRIN SERPL-MCNC: 289 MG/DL (ref 200–375)
WBC # BLD AUTO: 6.45 K/UL (ref 3.9–12.7)

## 2023-03-10 PROCEDURE — 99214 OFFICE O/P EST MOD 30 MIN: CPT | Mod: PBBFAC,PO | Performed by: INTERNAL MEDICINE

## 2023-03-10 PROCEDURE — 82728 ASSAY OF FERRITIN: CPT | Performed by: INTERNAL MEDICINE

## 2023-03-10 PROCEDURE — 36415 COLL VENOUS BLD VENIPUNCTURE: CPT | Performed by: INTERNAL MEDICINE

## 2023-03-10 PROCEDURE — 99999 PR PBB SHADOW E&M-EST. PATIENT-LVL IV: CPT | Mod: PBBFAC,,, | Performed by: INTERNAL MEDICINE

## 2023-03-10 PROCEDURE — 99204 PR OFFICE/OUTPT VISIT, NEW, LEVL IV, 45-59 MIN: ICD-10-PCS | Mod: S$PBB,,, | Performed by: INTERNAL MEDICINE

## 2023-03-10 PROCEDURE — 84466 ASSAY OF TRANSFERRIN: CPT | Performed by: INTERNAL MEDICINE

## 2023-03-10 PROCEDURE — 99999 PR PBB SHADOW E&M-EST. PATIENT-LVL IV: ICD-10-PCS | Mod: PBBFAC,,, | Performed by: INTERNAL MEDICINE

## 2023-03-10 PROCEDURE — 85025 COMPLETE CBC W/AUTO DIFF WBC: CPT | Performed by: INTERNAL MEDICINE

## 2023-03-10 PROCEDURE — 99204 OFFICE O/P NEW MOD 45 MIN: CPT | Mod: S$PBB,,, | Performed by: INTERNAL MEDICINE

## 2023-03-10 RX ORDER — SODIUM CHLORIDE 9 MG/ML
INJECTION, SOLUTION INTRAVENOUS CONTINUOUS
Status: CANCELLED | OUTPATIENT
Start: 2023-03-10

## 2023-03-10 RX ORDER — SODIUM CHLORIDE 0.9 % (FLUSH) 0.9 %
10 SYRINGE (ML) INJECTION
Status: CANCELLED | OUTPATIENT
Start: 2023-03-10

## 2023-03-10 RX ORDER — HEPARIN 100 UNIT/ML
5 SYRINGE INTRAVENOUS
Status: CANCELLED | OUTPATIENT
Start: 2023-03-10

## 2023-03-10 NOTE — PROGRESS NOTES
Service Date:  3/10/23    Chief Complaint: Anemia    Galdino Burrell is a 27 y.o. male referred to me for anemia.  Patient was recently admitted to the hospital and given 3 units of blood for 2 bleeding ulcers that were cauterized.  Patient is here for follow-up.  Blood work showed iron levels were okay, but this was shortly after the blood infusion which can artificially raise levels.  Patient states he feels lot better after the infusions.  Denies any current bleeding.    Review of Systems   Constitutional: Negative.    HENT: Negative.     Eyes: Negative.    Respiratory: Negative.     Cardiovascular: Negative.    Gastrointestinal: Negative.    Endocrine: Negative.    Genitourinary: Negative.    Musculoskeletal: Negative.    Integumentary:  Negative.   Neurological: Negative.    Hematological: Negative.    Psychiatric/Behavioral: Negative.        Current Outpatient Medications   Medication Instructions    ondansetron (ZOFRAN-ODT) 4 mg, Oral, Every 8 hours PRN    pantoprazole (PROTONIX) 40 mg, Oral, 2 times daily, Take 30 minutes before breakfast and dinner on empty stomach        No past medical history on file.     Past Surgical History:   Procedure Laterality Date    ESOPHAGOGASTRODUODENOSCOPY N/A 02/05/2023    Procedure: EGD (ESOPHAGOGASTRODUODENOSCOPY);  Surgeon: Carlos Echeverria MD;  Location: [facility];  Service: Gastroenterology;  Laterality: N/A;    ORTHOPEDIC SURGERY      UPPER GASTROINTESTINAL ENDOSCOPY          Family History   Problem Relation Age of Onset    Diabetes Mother     Hypertension Mother     Hypertension Father     Diabetes Father     Colon cancer Neg Hx     Colon polyps Neg Hx     Esophageal cancer Neg Hx     Stomach cancer Neg Hx        Social History     Tobacco Use    Smoking status: Never    Smokeless tobacco: Never   Substance Use Topics    Alcohol use: Yes     Comment: occasionally - not regularly    Drug use: Never         Vitals:    03/10/23 0830   BP: 132/72   Pulse: 83   Resp: 12    Temp: 97.1 °F (36.2 °C)        Physical Exam:  /72 (BP Location: Right arm, Patient Position: Sitting, BP Method: Large (Automatic))   Pulse 83   Temp 97.1 °F (36.2 °C) (Temporal)   Resp 12   Ht 6' (1.829 m)   Wt 98.1 kg (216 lb 4.3 oz)   SpO2 98%   BMI 29.33 kg/m²     Physical Exam  Vitals and nursing note reviewed.   Constitutional:       Appearance: Normal appearance.   HENT:      Head: Normocephalic and atraumatic.      Nose: Nose normal.      Mouth/Throat:      Mouth: Mucous membranes are moist.      Pharynx: Oropharynx is clear.   Eyes:      Extraocular Movements: Extraocular movements intact.      Conjunctiva/sclera: Conjunctivae normal.   Cardiovascular:      Rate and Rhythm: Normal rate and regular rhythm.      Heart sounds: Normal heart sounds.   Pulmonary:      Effort: Pulmonary effort is normal.      Breath sounds: Normal breath sounds.   Abdominal:      General: Abdomen is flat. Bowel sounds are normal.      Palpations: Abdomen is soft.   Musculoskeletal:         General: Normal range of motion.      Cervical back: Normal range of motion and neck supple.   Skin:     General: Skin is warm and dry.   Neurological:      General: No focal deficit present.      Mental Status: He is alert and oriented to person, place, and time. Mental status is at baseline.   Psychiatric:         Mood and Affect: Mood normal.        Labs:  Lab Results   Component Value Date    WBC 5.59 02/12/2023    RBC 3.20 (L) 02/12/2023    HGB 9.6 (L) 02/12/2023    HCT 30.1 (L) 02/12/2023    MCV 94 02/12/2023    MCH 30.0 02/12/2023    MCHC 31.9 (L) 02/12/2023    RDW 16.7 (H) 02/12/2023     02/12/2023    MPV 10.1 02/12/2023    GRAN 2.7 02/12/2023    GRAN 49.0 02/12/2023    LYMPH 2.1 02/12/2023    LYMPH 38.1 02/12/2023    MONO 0.4 02/12/2023    MONO 7.5 02/12/2023    EOS 0.3 02/12/2023    BASO 0.03 02/12/2023    EOSINOPHIL 4.5 02/12/2023    BASOPHIL 0.5 02/12/2023     Sodium   Date Value Ref Range Status   02/12/2023  139 136 - 145 mmol/L Final     Potassium   Date Value Ref Range Status   02/12/2023 4.1 3.5 - 5.1 mmol/L Final     Chloride   Date Value Ref Range Status   02/12/2023 108 95 - 110 mmol/L Final     CO2   Date Value Ref Range Status   02/12/2023 25 23 - 29 mmol/L Final     Glucose   Date Value Ref Range Status   02/12/2023 96 70 - 110 mg/dL Final     BUN   Date Value Ref Range Status   02/12/2023 10 6 - 20 mg/dL Final     Creatinine   Date Value Ref Range Status   02/12/2023 1.1 0.5 - 1.4 mg/dL Final     Calcium   Date Value Ref Range Status   02/12/2023 9.1 8.7 - 10.5 mg/dL Final     Total Protein   Date Value Ref Range Status   02/12/2023 6.6 6.0 - 8.4 g/dL Final     Albumin   Date Value Ref Range Status   02/12/2023 3.5 3.5 - 5.2 g/dL Final     Total Bilirubin   Date Value Ref Range Status   02/12/2023 0.2 0.1 - 1.0 mg/dL Final     Comment:     For infants and newborns, interpretation of results should be based  on gestational age, weight and in agreement with clinical  observations.    Premature Infant recommended reference ranges:  Up to 24 hours.............<8.0 mg/dL  Up to 48 hours............<12.0 mg/dL  3-5 days..................<15.0 mg/dL  6-29 days.................<15.0 mg/dL       Alkaline Phosphatase   Date Value Ref Range Status   02/12/2023 77 55 - 135 U/L Final     AST   Date Value Ref Range Status   02/12/2023 23 10 - 40 U/L Final     ALT   Date Value Ref Range Status   02/12/2023 65 (H) 10 - 44 U/L Final     Anion Gap   Date Value Ref Range Status   02/12/2023 6 (L) 8 - 16 mmol/L Final       A/P:    Iron deficiency anemia   -due to bleeding ulcers   -will check iron level and CBC today to get an accurate result as it has been about 1 month since the blood infusion.  He will likely need IV iron, but this will help me calculate how much he needs.  I will call him to let him know the plan after the results are in.      Aurash Khoobehi, MD  Hematology and Oncology

## 2023-03-10 NOTE — PROGRESS NOTES
Called patient to inform him that he has an iron deficiency anemia.  I will give him 2 doses of IV Injectafer and recheck the values 4 weeks after that.  Patient is agreeable.  We will submit for authorization today.

## 2023-03-13 ENCOUNTER — HOSPITAL ENCOUNTER (OUTPATIENT)
Dept: RADIOLOGY | Facility: HOSPITAL | Age: 28
Discharge: HOME | End: 2023-03-13
Payer: OTHER GOVERNMENT

## 2023-03-13 ENCOUNTER — TELEPHONE (OUTPATIENT)
Dept: GASTROENTEROLOGY | Facility: CLINIC | Age: 28
End: 2023-03-13
Payer: OTHER GOVERNMENT

## 2023-03-13 DIAGNOSIS — R74.8 ELEVATED LIVER ENZYMES: ICD-10-CM

## 2023-03-13 PROCEDURE — 76705 ECHO EXAM OF ABDOMEN: CPT | Mod: 26,,, | Performed by: RADIOLOGY

## 2023-03-13 PROCEDURE — 76705 ECHO EXAM OF ABDOMEN: CPT | Mod: TC

## 2023-03-13 PROCEDURE — 76705 US ABDOMEN LIMITED: ICD-10-PCS | Mod: 26,,, | Performed by: RADIOLOGY

## 2023-03-13 NOTE — TELEPHONE ENCOUNTER
----- Message from Xena Guillory sent at 3/13/2023  2:34 PM CDT -----  Contact: self  Type:  Patient Returning Call    Who Called:  patient  Who Left Message for Patient:   can't see who  Does the patient know what this is regarding?:   maybe results  Best Call Back Number [phone] (home)   Additional Information:  Thanks

## 2023-03-23 DIAGNOSIS — R79.89 ABNORMAL TSH: Primary | ICD-10-CM

## 2023-04-17 ENCOUNTER — INFUSION (OUTPATIENT)
Dept: INFUSION THERAPY | Facility: HOSPITAL | Age: 28
End: 2023-04-17
Attending: INTERNAL MEDICINE
Payer: OTHER GOVERNMENT

## 2023-04-17 VITALS
RESPIRATION RATE: 15 BRPM | HEIGHT: 72 IN | DIASTOLIC BLOOD PRESSURE: 68 MMHG | WEIGHT: 224.31 LBS | SYSTOLIC BLOOD PRESSURE: 125 MMHG | HEART RATE: 57 BPM | BODY MASS INDEX: 30.38 KG/M2 | OXYGEN SATURATION: 100 % | TEMPERATURE: 98 F

## 2023-04-17 DIAGNOSIS — D50.0 IRON DEFICIENCY ANEMIA DUE TO CHRONIC BLOOD LOSS: Primary | ICD-10-CM

## 2023-04-17 PROCEDURE — 96365 THER/PROPH/DIAG IV INF INIT: CPT

## 2023-04-17 PROCEDURE — A4216 STERILE WATER/SALINE, 10 ML: HCPCS | Performed by: INTERNAL MEDICINE

## 2023-04-17 PROCEDURE — 63600175 PHARM REV CODE 636 W HCPCS: Mod: JZ,JG | Performed by: INTERNAL MEDICINE

## 2023-04-17 PROCEDURE — 25000003 PHARM REV CODE 250: Performed by: INTERNAL MEDICINE

## 2023-04-17 RX ORDER — SODIUM CHLORIDE 0.9 % (FLUSH) 0.9 %
10 SYRINGE (ML) INJECTION
OUTPATIENT
Start: 2023-04-24

## 2023-04-17 RX ORDER — SODIUM CHLORIDE 9 MG/ML
INJECTION, SOLUTION INTRAVENOUS CONTINUOUS
OUTPATIENT
Start: 2023-04-24

## 2023-04-17 RX ORDER — HEPARIN 100 UNIT/ML
5 SYRINGE INTRAVENOUS
OUTPATIENT
Start: 2023-04-24

## 2023-04-17 RX ORDER — SODIUM CHLORIDE 9 MG/ML
INJECTION, SOLUTION INTRAVENOUS CONTINUOUS
Status: DISCONTINUED | OUTPATIENT
Start: 2023-04-17 | End: 2023-04-17 | Stop reason: HOSPADM

## 2023-04-17 RX ORDER — SODIUM CHLORIDE 0.9 % (FLUSH) 0.9 %
10 SYRINGE (ML) INJECTION
Status: DISCONTINUED | OUTPATIENT
Start: 2023-04-17 | End: 2023-04-17 | Stop reason: HOSPADM

## 2023-04-17 RX ADMIN — SODIUM CHLORIDE: 0.9 INJECTION, SOLUTION INTRAVENOUS at 01:04

## 2023-04-17 RX ADMIN — FERRIC CARBOXYMALTOSE INJECTION 750 MG: 50 INJECTION, SOLUTION INTRAVENOUS at 01:04

## 2023-04-17 RX ADMIN — SODIUM CHLORIDE, PRESERVATIVE FREE 10 ML: 5 INJECTION INTRAVENOUS at 02:04

## 2023-04-17 NOTE — PLAN OF CARE
Problem: Anemia  Goal: Anemia Symptom Improvement  Outcome: Ongoing, Progressing  Intervention: Monitor and Manage Anemia  Flowsheets (Taken 4/17/2023 1321)  Oral Nutrition Promotion: rest periods promoted  Safety Promotion/Fall Prevention: medications reviewed  Fatigue Management:   fatigue-related activity identified   frequent rest breaks encouraged   paced activity encouraged

## 2023-05-22 ENCOUNTER — PATIENT MESSAGE (OUTPATIENT)
Dept: HEMATOLOGY/ONCOLOGY | Facility: CLINIC | Age: 28
End: 2023-05-22
Payer: OTHER GOVERNMENT

## 2023-05-25 ENCOUNTER — PATIENT MESSAGE (OUTPATIENT)
Dept: HEMATOLOGY/ONCOLOGY | Facility: CLINIC | Age: 28
End: 2023-05-25
Payer: OTHER GOVERNMENT
